# Patient Record
Sex: FEMALE | Race: WHITE | NOT HISPANIC OR LATINO
[De-identification: names, ages, dates, MRNs, and addresses within clinical notes are randomized per-mention and may not be internally consistent; named-entity substitution may affect disease eponyms.]

---

## 2017-01-04 ENCOUNTER — APPOINTMENT (OUTPATIENT)
Dept: INTERNAL MEDICINE | Facility: CLINIC | Age: 37
End: 2017-01-04

## 2017-01-04 VITALS
SYSTOLIC BLOOD PRESSURE: 120 MMHG | DIASTOLIC BLOOD PRESSURE: 80 MMHG | WEIGHT: 196 LBS | BODY MASS INDEX: 33.46 KG/M2 | TEMPERATURE: 97.8 F | HEIGHT: 64 IN

## 2017-01-04 DIAGNOSIS — E55.9 VITAMIN D DEFICIENCY, UNSPECIFIED: ICD-10-CM

## 2017-01-06 ENCOUNTER — MED ADMIN CHARGE (OUTPATIENT)
Age: 37
End: 2017-01-06

## 2017-12-26 ENCOUNTER — LABORATORY RESULT (OUTPATIENT)
Age: 37
End: 2017-12-26

## 2017-12-26 ENCOUNTER — APPOINTMENT (OUTPATIENT)
Dept: INTERNAL MEDICINE | Facility: CLINIC | Age: 37
End: 2017-12-26
Payer: COMMERCIAL

## 2017-12-26 PROCEDURE — 36415 COLL VENOUS BLD VENIPUNCTURE: CPT

## 2017-12-27 LAB
25(OH)D3 SERPL-MCNC: 22.4 NG/ML
ALBUMIN SERPL ELPH-MCNC: 4.2 G/DL
ALP BLD-CCNC: 49 U/L
ALT SERPL-CCNC: 11 U/L
ANION GAP SERPL CALC-SCNC: 13 MMOL/L
APPEARANCE: CLEAR
AST SERPL-CCNC: 20 U/L
BASOPHILS # BLD AUTO: 0.01 K/UL
BASOPHILS NFR BLD AUTO: 0.2 %
BILIRUB SERPL-MCNC: 0.2 MG/DL
BILIRUBIN URINE: NEGATIVE
BLOOD URINE: NEGATIVE
BUN SERPL-MCNC: 13 MG/DL
CALCIUM SERPL-MCNC: 9.3 MG/DL
CHLORIDE SERPL-SCNC: 105 MMOL/L
CHOLEST SERPL-MCNC: 199 MG/DL
CHOLEST/HDLC SERPL: 4.3 RATIO
CO2 SERPL-SCNC: 22 MMOL/L
COLOR: YELLOW
CREAT SERPL-MCNC: 0.87 MG/DL
EOSINOPHIL # BLD AUTO: 0.03 K/UL
EOSINOPHIL NFR BLD AUTO: 0.5
ERYTHROCYTE [SEDIMENTATION RATE] IN BLOOD BY WESTERGREN METHOD: 2 MM/HR
GLUCOSE QUALITATIVE U: NEGATIVE MG/DL
GLUCOSE SERPL-MCNC: 92 MG/DL
HCT VFR BLD CALC: 39.9 %
HDLC SERPL-MCNC: 46 MG/DL
HGB BLD-MCNC: 13.1 G/DL
IMM GRANULOCYTES NFR BLD AUTO: 0.2 %
KETONES URINE: NEGATIVE
LDLC SERPL CALC-MCNC: 120 MG/DL
LDLC SERPL DIRECT ASSAY-MCNC: 123 MG/DL
LEUKOCYTE ESTERASE URINE: ABNORMAL
LYMPHOCYTES # BLD AUTO: 2.11 K/UL
LYMPHOCYTES NFR BLD AUTO: 35.8 %
MAN DIFF?: NORMAL
MCHC RBC-ENTMCNC: 29.1 PG
MCHC RBC-ENTMCNC: 32.8 GM/DL
MCV RBC AUTO: 88.7 FL
MONOCYTES # BLD AUTO: 0.3 K/UL
MONOCYTES NFR BLD AUTO: 5.1 %
NEUTROPHILS # BLD AUTO: 3.44 K/UL
NEUTROPHILS NFR BLD AUTO: 58.2 %
NITRITE URINE: NEGATIVE
PH URINE: 5.5
PLATELET # BLD AUTO: 245 K/UL
POTASSIUM SERPL-SCNC: 4.6 MMOL/L
PROT SERPL-MCNC: 7.3 G/DL
PROTEIN URINE: NEGATIVE MG/DL
RBC # BLD: 4.5 M/UL
RBC # FLD: 13.2 %
SAVE SPECIMEN: NORMAL
SODIUM SERPL-SCNC: 140 MMOL/L
SPECIFIC GRAVITY URINE: 1.02
T3 SERPL-MCNC: 91 NG/DL
T3RU NFR SERPL: 1.14 INDEX
T4 FREE SERPL-MCNC: 1 NG/DL
TRIGL SERPL-MCNC: 163 MG/DL
TSH SERPL-ACNC: 2.87 UIU/ML
UROBILINOGEN URINE: NEGATIVE MG/DL
WBC # FLD AUTO: 5.9 K/UL

## 2018-01-08 ENCOUNTER — APPOINTMENT (OUTPATIENT)
Dept: INTERNAL MEDICINE | Facility: CLINIC | Age: 38
End: 2018-01-08
Payer: COMMERCIAL

## 2018-01-08 VITALS
DIASTOLIC BLOOD PRESSURE: 80 MMHG | SYSTOLIC BLOOD PRESSURE: 110 MMHG | TEMPERATURE: 98.2 F | WEIGHT: 190 LBS | BODY MASS INDEX: 32.44 KG/M2 | HEIGHT: 64 IN

## 2018-01-08 PROCEDURE — 93000 ELECTROCARDIOGRAM COMPLETE: CPT

## 2018-01-08 PROCEDURE — 94010 BREATHING CAPACITY TEST: CPT

## 2018-01-08 PROCEDURE — 99395 PREV VISIT EST AGE 18-39: CPT | Mod: 25

## 2018-01-09 ENCOUNTER — TRANSCRIPTION ENCOUNTER (OUTPATIENT)
Age: 38
End: 2018-01-09

## 2018-12-26 ENCOUNTER — LABORATORY RESULT (OUTPATIENT)
Age: 38
End: 2018-12-26

## 2018-12-26 ENCOUNTER — APPOINTMENT (OUTPATIENT)
Dept: INTERNAL MEDICINE | Facility: CLINIC | Age: 38
End: 2018-12-26
Payer: COMMERCIAL

## 2018-12-26 LAB
ALBUMIN SERPL ELPH-MCNC: 4.3 G/DL
ALP BLD-CCNC: 67 U/L
ALT SERPL-CCNC: 20 U/L
ANION GAP SERPL CALC-SCNC: 8 MMOL/L
APPEARANCE: CLEAR
AST SERPL-CCNC: 24 U/L
BASOPHILS # BLD AUTO: 0.01 K/UL
BASOPHILS NFR BLD AUTO: 0.2 %
BILIRUB SERPL-MCNC: 0.2 MG/DL
BILIRUBIN URINE: NEGATIVE
BLOOD URINE: NEGATIVE
BUN SERPL-MCNC: 10 MG/DL
CALCIUM SERPL-MCNC: 9.7 MG/DL
CHLORIDE SERPL-SCNC: 103 MMOL/L
CHOLEST SERPL-MCNC: 225 MG/DL
CHOLEST/HDLC SERPL: 5.6 RATIO
CO2 SERPL-SCNC: 24 MMOL/L
COLOR: YELLOW
CREAT SERPL-MCNC: 0.85 MG/DL
EOSINOPHIL # BLD AUTO: 0.07 K/UL
EOSINOPHIL NFR BLD AUTO: 1.2 %
ERYTHROCYTE [SEDIMENTATION RATE] IN BLOOD BY WESTERGREN METHOD: 5 MM/HR
GLUCOSE QUALITATIVE U: NEGATIVE MG/DL
GLUCOSE SERPL-MCNC: 87 MG/DL
HCT VFR BLD CALC: 38.8 %
HDLC SERPL-MCNC: 40 MG/DL
HGB BLD-MCNC: 12.8 G/DL
IMM GRANULOCYTES NFR BLD AUTO: 0.2 %
KETONES URINE: NEGATIVE
LDLC SERPL CALC-MCNC: 135 MG/DL
LDLC SERPL DIRECT ASSAY-MCNC: 148 MG/DL
LEUKOCYTE ESTERASE URINE: NEGATIVE
LYMPHOCYTES # BLD AUTO: 2.6 K/UL
LYMPHOCYTES NFR BLD AUTO: 44.7 %
MAN DIFF?: NORMAL
MCHC RBC-ENTMCNC: 28.4 PG
MCHC RBC-ENTMCNC: 33 GM/DL
MCV RBC AUTO: 86 FL
MONOCYTES # BLD AUTO: 0.33 K/UL
MONOCYTES NFR BLD AUTO: 5.7 %
NEUTROPHILS # BLD AUTO: 2.8 K/UL
NEUTROPHILS NFR BLD AUTO: 48 %
NITRITE URINE: NEGATIVE
PH URINE: 6
PLATELET # BLD AUTO: 255 K/UL
POTASSIUM SERPL-SCNC: 3.9 MMOL/L
PROT SERPL-MCNC: 7.2 G/DL
PROTEIN URINE: NEGATIVE MG/DL
RBC # BLD: 4.51 M/UL
RBC # FLD: 13 %
SAVE SPECIMEN: NORMAL
SODIUM SERPL-SCNC: 135 MMOL/L
SPECIFIC GRAVITY URINE: 1.02
T3 SERPL-MCNC: 82 NG/DL
T3RU NFR SERPL: 1.02 INDEX
T4 FREE SERPL-MCNC: 1 NG/DL
TRIGL SERPL-MCNC: 249 MG/DL
TSH SERPL-ACNC: 2.47 UIU/ML
UROBILINOGEN URINE: NEGATIVE MG/DL
WBC # FLD AUTO: 5.82 K/UL

## 2018-12-26 PROCEDURE — 36415 COLL VENOUS BLD VENIPUNCTURE: CPT

## 2018-12-27 LAB — 25(OH)D3 SERPL-MCNC: 13.8 NG/ML

## 2019-01-14 ENCOUNTER — APPOINTMENT (OUTPATIENT)
Dept: INTERNAL MEDICINE | Facility: CLINIC | Age: 39
End: 2019-01-14
Payer: COMMERCIAL

## 2019-01-14 VITALS
SYSTOLIC BLOOD PRESSURE: 110 MMHG | BODY MASS INDEX: 34.15 KG/M2 | HEIGHT: 64 IN | TEMPERATURE: 96.9 F | WEIGHT: 200 LBS | DIASTOLIC BLOOD PRESSURE: 68 MMHG

## 2019-01-14 DIAGNOSIS — E78.00 PURE HYPERCHOLESTEROLEMIA, UNSPECIFIED: ICD-10-CM

## 2019-01-14 PROCEDURE — 94010 BREATHING CAPACITY TEST: CPT

## 2019-01-14 PROCEDURE — 99396 PREV VISIT EST AGE 40-64: CPT | Mod: 25

## 2019-01-14 PROCEDURE — 93000 ELECTROCARDIOGRAM COMPLETE: CPT

## 2019-01-14 RX ORDER — NORETHINDRONE ACETATE AND ETHINYL ESTRADIOL 1.5-30(21)
KIT ORAL
Refills: 0 | Status: COMPLETED | COMMUNITY
End: 2019-01-14

## 2019-01-21 NOTE — HEALTH RISK ASSESSMENT
[Patient reported PAP Smear was normal] : Patient reported PAP Smear was normal [Good] : ~his/her~  mood as  good [Patient reported mammogram was normal] : Patient reported mammogram was normal [] : No [de-identified] : rare/sociall [MammogramDate] : 12/18 [MammogramComments] : at [facility] [PapSmearDate] : 12/18 [PapSmearComments] : at [facility]-cycles are regular on OCPs

## 2019-01-21 NOTE — REVIEW OF SYSTEMS
[Negative] : Heme/Lymph [Recent Change In Weight] : ~T recent weight change [FreeTextEntry2] : weight is up since last year

## 2019-01-21 NOTE — DATA REVIEWED
[FreeTextEntry1] : EKG today was within normal limits\par PFT revealed mild restriction-not significantly changed from PFT from 01/08/2018\par

## 2019-01-21 NOTE — HISTORY OF PRESENT ILLNESS
[FreeTextEntry1] : Pt. presents for a comprehensive evaluation for multiple medical issues.\par  [de-identified] : Patient has been in good overall health this past year.\par \par She reports her weight is slight up since last year but overall she is feeling good.\par She lives with her boyfriend of 4 years and they are considering having a baby together this year.

## 2019-03-26 ENCOUNTER — APPOINTMENT (OUTPATIENT)
Dept: OBGYN | Facility: CLINIC | Age: 39
End: 2019-03-26

## 2019-06-12 ENCOUNTER — APPOINTMENT (OUTPATIENT)
Dept: OBGYN | Facility: CLINIC | Age: 39
End: 2019-06-12
Payer: COMMERCIAL

## 2019-06-12 ENCOUNTER — NON-APPOINTMENT (OUTPATIENT)
Age: 39
End: 2019-06-12

## 2019-06-12 ENCOUNTER — LABORATORY RESULT (OUTPATIENT)
Age: 39
End: 2019-06-12

## 2019-06-12 VITALS
SYSTOLIC BLOOD PRESSURE: 122 MMHG | DIASTOLIC BLOOD PRESSURE: 80 MMHG | WEIGHT: 200 LBS | HEIGHT: 64 IN | BODY MASS INDEX: 34.15 KG/M2

## 2019-06-12 PROCEDURE — 36415 COLL VENOUS BLD VENIPUNCTURE: CPT

## 2019-06-13 ENCOUNTER — NON-APPOINTMENT (OUTPATIENT)
Age: 39
End: 2019-06-13

## 2019-06-14 LAB
APPEARANCE: ABNORMAL
BACTERIA UR CULT: NORMAL
BILIRUBIN URINE: NEGATIVE
BLOOD URINE: NEGATIVE
COLOR: NORMAL
GLUCOSE QUALITATIVE U: NEGATIVE
KETONES URINE: NEGATIVE
LEUKOCYTE ESTERASE URINE: ABNORMAL
MEV IGG FLD QL IA: 59.3 AU/ML
MEV IGG+IGM SER-IMP: POSITIVE
NITRITE URINE: NEGATIVE
PH URINE: 6
PROTEIN URINE: NORMAL
SPECIFIC GRAVITY URINE: 1.01
UROBILINOGEN URINE: NORMAL

## 2019-06-19 ENCOUNTER — APPOINTMENT (OUTPATIENT)
Dept: OBGYN | Facility: CLINIC | Age: 39
End: 2019-06-19

## 2019-06-24 ENCOUNTER — NON-APPOINTMENT (OUTPATIENT)
Age: 39
End: 2019-06-24

## 2019-06-24 ENCOUNTER — APPOINTMENT (OUTPATIENT)
Dept: OBGYN | Facility: CLINIC | Age: 39
End: 2019-06-24
Payer: COMMERCIAL

## 2019-06-24 VITALS — BODY MASS INDEX: 33.82 KG/M2 | WEIGHT: 197 LBS | DIASTOLIC BLOOD PRESSURE: 70 MMHG | SYSTOLIC BLOOD PRESSURE: 120 MMHG

## 2019-06-24 LAB
BILIRUB UR QL STRIP: NORMAL
CLARITY UR: CLEAR
COLLECTION METHOD: NORMAL
GLUCOSE UR-MCNC: 250
HCG UR QL: 0.2 EU/DL
HGB UR QL STRIP.AUTO: NORMAL
KETONES UR-MCNC: NORMAL
LEUKOCYTE ESTERASE UR QL STRIP: NORMAL
NITRITE UR QL STRIP: NORMAL
PH UR STRIP: 5.5
PROT UR STRIP-MCNC: NORMAL
SP GR UR STRIP: 1.03

## 2019-06-24 PROCEDURE — 36415 COLL VENOUS BLD VENIPUNCTURE: CPT

## 2019-06-24 PROCEDURE — 0502F SUBSEQUENT PRENATAL CARE: CPT

## 2019-06-25 ENCOUNTER — NON-APPOINTMENT (OUTPATIENT)
Age: 39
End: 2019-06-25

## 2019-06-25 LAB
BASOPHILS # BLD AUTO: 0.02 K/UL
BASOPHILS NFR BLD AUTO: 0.2 %
EOSINOPHIL # BLD AUTO: 0.05 K/UL
EOSINOPHIL NFR BLD AUTO: 0.6 %
GLUCOSE 1H P 50 G GLC PO SERPL-MCNC: 121 MG/DL
HCT VFR BLD CALC: 34.4 %
HGB BLD-MCNC: 10.9 G/DL
IMM GRANULOCYTES NFR BLD AUTO: 0.4 %
LYMPHOCYTES # BLD AUTO: 2.12 K/UL
LYMPHOCYTES NFR BLD AUTO: 23.6 %
MAN DIFF?: NORMAL
MCHC RBC-ENTMCNC: 29.5 PG
MCHC RBC-ENTMCNC: 31.7 GM/DL
MCV RBC AUTO: 93 FL
MONOCYTES # BLD AUTO: 0.59 K/UL
MONOCYTES NFR BLD AUTO: 6.6 %
NEUTROPHILS # BLD AUTO: 6.15 K/UL
NEUTROPHILS NFR BLD AUTO: 68.6 %
PLATELET # BLD AUTO: 202 K/UL
RBC # BLD: 3.7 M/UL
RBC # FLD: 13.8 %
WBC # FLD AUTO: 8.97 K/UL

## 2019-07-12 ENCOUNTER — NON-APPOINTMENT (OUTPATIENT)
Age: 39
End: 2019-07-12

## 2019-07-15 ENCOUNTER — NON-APPOINTMENT (OUTPATIENT)
Age: 39
End: 2019-07-15

## 2019-07-15 ENCOUNTER — APPOINTMENT (OUTPATIENT)
Dept: OBGYN | Facility: CLINIC | Age: 39
End: 2019-07-15
Payer: COMMERCIAL

## 2019-07-15 VITALS
BODY MASS INDEX: 34.66 KG/M2 | WEIGHT: 203 LBS | HEIGHT: 64 IN | DIASTOLIC BLOOD PRESSURE: 76 MMHG | SYSTOLIC BLOOD PRESSURE: 120 MMHG

## 2019-07-15 LAB
BILIRUB UR QL STRIP: NEGATIVE
CLARITY UR: NORMAL
COLLECTION METHOD: NORMAL
GLUCOSE UR-MCNC: >=1000
HCG UR QL: 0.2 EU/DL
HGB UR QL STRIP.AUTO: NEGATIVE
KETONES UR-MCNC: NORMAL
LEUKOCYTE ESTERASE UR QL STRIP: NORMAL
NITRITE UR QL STRIP: NEGATIVE
PH UR STRIP: 5.5
PROT UR STRIP-MCNC: NEGATIVE
SP GR UR STRIP: 1.01

## 2019-07-15 PROCEDURE — 0502F SUBSEQUENT PRENATAL CARE: CPT

## 2019-07-30 ENCOUNTER — APPOINTMENT (OUTPATIENT)
Dept: OBGYN | Facility: CLINIC | Age: 39
End: 2019-07-30
Payer: COMMERCIAL

## 2019-07-30 VITALS
DIASTOLIC BLOOD PRESSURE: 72 MMHG | BODY MASS INDEX: 34.15 KG/M2 | WEIGHT: 200 LBS | HEIGHT: 64 IN | SYSTOLIC BLOOD PRESSURE: 120 MMHG

## 2019-07-30 DIAGNOSIS — Z3A.32 32 WEEKS GESTATION OF PREGNANCY: ICD-10-CM

## 2019-07-30 PROCEDURE — 90715 TDAP VACCINE 7 YRS/> IM: CPT

## 2019-07-30 PROCEDURE — 90471 IMMUNIZATION ADMIN: CPT

## 2019-07-30 PROCEDURE — 0502F SUBSEQUENT PRENATAL CARE: CPT

## 2019-07-31 ENCOUNTER — MED ADMIN CHARGE (OUTPATIENT)
Age: 39
End: 2019-07-31

## 2019-08-05 LAB
BILIRUB UR QL STRIP: NEGATIVE
GLUCOSE UR-MCNC: NEGATIVE
HCG UR QL: 0.2 EU/DL
HGB UR QL STRIP.AUTO: NORMAL
KETONES UR-MCNC: NORMAL
LEUKOCYTE ESTERASE UR QL STRIP: NORMAL
NITRITE UR QL STRIP: NEGATIVE
PH UR STRIP: 6
PROT UR STRIP-MCNC: NORMAL
SP GR UR STRIP: 1.03

## 2019-08-14 ENCOUNTER — APPOINTMENT (OUTPATIENT)
Dept: OBGYN | Facility: CLINIC | Age: 39
End: 2019-08-14
Payer: COMMERCIAL

## 2019-08-14 ENCOUNTER — NON-APPOINTMENT (OUTPATIENT)
Age: 39
End: 2019-08-14

## 2019-08-14 VITALS
BODY MASS INDEX: 33.8 KG/M2 | WEIGHT: 198 LBS | HEIGHT: 64 IN | DIASTOLIC BLOOD PRESSURE: 80 MMHG | SYSTOLIC BLOOD PRESSURE: 120 MMHG

## 2019-08-14 LAB
BILIRUB UR QL STRIP: NEGATIVE
GLUCOSE UR-MCNC: NEGATIVE
HGB UR QL STRIP.AUTO: NORMAL
KETONES UR-MCNC: NEGATIVE
LEUKOCYTE ESTERASE UR QL STRIP: NORMAL
NITRITE UR QL STRIP: NEGATIVE
PH UR STRIP: 6
PROT UR STRIP-MCNC: 30
SP GR UR STRIP: 1.02

## 2019-08-14 PROCEDURE — 81015 MICROSCOPIC EXAM OF URINE: CPT

## 2019-08-14 PROCEDURE — 0502F SUBSEQUENT PRENATAL CARE: CPT

## 2019-08-16 ENCOUNTER — APPOINTMENT (OUTPATIENT)
Dept: OBGYN | Facility: CLINIC | Age: 39
End: 2019-08-16
Payer: COMMERCIAL

## 2019-08-16 ENCOUNTER — NON-APPOINTMENT (OUTPATIENT)
Age: 39
End: 2019-08-16

## 2019-08-16 VITALS
SYSTOLIC BLOOD PRESSURE: 118 MMHG | BODY MASS INDEX: 34.15 KG/M2 | DIASTOLIC BLOOD PRESSURE: 78 MMHG | WEIGHT: 200 LBS | HEIGHT: 64 IN

## 2019-08-16 PROCEDURE — 59025 FETAL NON-STRESS TEST: CPT

## 2019-08-28 ENCOUNTER — APPOINTMENT (OUTPATIENT)
Dept: OBGYN | Facility: CLINIC | Age: 39
End: 2019-08-28
Payer: COMMERCIAL

## 2019-08-28 ENCOUNTER — NON-APPOINTMENT (OUTPATIENT)
Age: 39
End: 2019-08-28

## 2019-08-28 VITALS
BODY MASS INDEX: 34.15 KG/M2 | HEIGHT: 64 IN | SYSTOLIC BLOOD PRESSURE: 120 MMHG | DIASTOLIC BLOOD PRESSURE: 70 MMHG | WEIGHT: 200 LBS

## 2019-08-28 PROCEDURE — 0502F SUBSEQUENT PRENATAL CARE: CPT

## 2019-09-03 ENCOUNTER — APPOINTMENT (OUTPATIENT)
Dept: OBGYN | Facility: CLINIC | Age: 39
End: 2019-09-03
Payer: COMMERCIAL

## 2019-09-03 ENCOUNTER — NON-APPOINTMENT (OUTPATIENT)
Age: 39
End: 2019-09-03

## 2019-09-03 VITALS
BODY MASS INDEX: 34.15 KG/M2 | SYSTOLIC BLOOD PRESSURE: 118 MMHG | HEIGHT: 64 IN | WEIGHT: 200 LBS | DIASTOLIC BLOOD PRESSURE: 70 MMHG

## 2019-09-03 LAB
BILIRUB UR QL STRIP: NORMAL
CLARITY UR: NORMAL
COLLECTION METHOD: NORMAL
GLUCOSE UR-MCNC: NORMAL
HCG UR QL: 1 EU/DL
HGB UR QL STRIP.AUTO: NORMAL
KETONES UR-MCNC: 15
LEUKOCYTE ESTERASE UR QL STRIP: NORMAL
NITRITE UR QL STRIP: NORMAL
PH UR STRIP: 6.5
PROT UR STRIP-MCNC: NORMAL
SP GR UR STRIP: 1.02

## 2019-09-03 PROCEDURE — 59025 FETAL NON-STRESS TEST: CPT

## 2019-09-03 PROCEDURE — 0502F SUBSEQUENT PRENATAL CARE: CPT

## 2019-09-05 ENCOUNTER — NON-APPOINTMENT (OUTPATIENT)
Age: 39
End: 2019-09-05

## 2019-09-11 ENCOUNTER — APPOINTMENT (OUTPATIENT)
Dept: OBGYN | Facility: CLINIC | Age: 39
End: 2019-09-11
Payer: COMMERCIAL

## 2019-09-11 ENCOUNTER — NON-APPOINTMENT (OUTPATIENT)
Age: 39
End: 2019-09-11

## 2019-09-11 VITALS
HEIGHT: 64 IN | DIASTOLIC BLOOD PRESSURE: 70 MMHG | SYSTOLIC BLOOD PRESSURE: 116 MMHG | BODY MASS INDEX: 34.15 KG/M2 | WEIGHT: 200 LBS

## 2019-09-11 PROCEDURE — 59426 ANTEPARTUM CARE ONLY: CPT

## 2019-09-11 PROCEDURE — 0502F SUBSEQUENT PRENATAL CARE: CPT

## 2019-09-14 LAB
BILIRUB UR QL STRIP: NEGATIVE
CLARITY UR: CLEAR
COLLECTION METHOD: NORMAL
GLUCOSE UR-MCNC: NEGATIVE
HCG UR QL: 0.2 EU/DL
HGB UR QL STRIP.AUTO: NEGATIVE
KETONES UR-MCNC: NEGATIVE
LEUKOCYTE ESTERASE UR QL STRIP: NEGATIVE
NITRITE UR QL STRIP: NEGATIVE
PH UR STRIP: 6
PROT UR STRIP-MCNC: NEGATIVE
SP GR UR STRIP: 1.01

## 2019-09-18 ENCOUNTER — APPOINTMENT (OUTPATIENT)
Dept: OBGYN | Facility: CLINIC | Age: 39
End: 2019-09-18

## 2019-09-24 NOTE — DISCUSSION/SUMMARY
[Home] : patient was discharged to home [FreeTextEntry1] : Discharge Diagnosis: Term pregnancy delivered, Viable male

## 2019-09-30 LAB
BILIRUB UR QL STRIP: NEGATIVE
CLARITY UR: CLEAR
COLLECTION METHOD: NORMAL
GLUCOSE UR-MCNC: NEGATIVE
HCG UR QL: 1 EU/DL
HGB UR QL STRIP.AUTO: NORMAL
KETONES UR-MCNC: 80
LEUKOCYTE ESTERASE UR QL STRIP: NORMAL
NITRITE UR QL STRIP: NEGATIVE
PH UR STRIP: 6
PROT UR STRIP-MCNC: 30
SP GR UR STRIP: 1.02

## 2019-10-02 ENCOUNTER — APPOINTMENT (OUTPATIENT)
Dept: OBGYN | Facility: CLINIC | Age: 39
End: 2019-10-02
Payer: COMMERCIAL

## 2019-10-02 VITALS
WEIGHT: 184 LBS | HEIGHT: 64 IN | SYSTOLIC BLOOD PRESSURE: 116 MMHG | BODY MASS INDEX: 31.41 KG/M2 | DIASTOLIC BLOOD PRESSURE: 70 MMHG

## 2019-10-02 DIAGNOSIS — Z48.89 ENCOUNTER FOR OTHER SPECIFIED SURGICAL AFTERCARE: ICD-10-CM

## 2019-10-02 PROCEDURE — 0503F POSTPARTUM CARE VISIT: CPT

## 2019-10-02 NOTE — REVIEW OF SYSTEMS
[Chills] : no chills [Feeling Tired] : feeling tired [Chest Pain] : no chest pain [Dyspnea] : no shortness of breath [Abdominal Pain] : abdominal pain [Melena] : no melena [Constipation] : no constipation [Dysuria] : no dysuria [Pelvic Pain] : no pelvic pain [Frequency] : no frequency [Urgency] : no urgency [Arthralgias] : no arthralgias [Skin Lesions] : no skin lesions [Change In A Mole] : no change in a mole [Breast Pain] : breast pain [Breast Lump] : breast lump [Convulsions] : no convulsions [Fainting] : no fainting [Dizziness] : no dizziness [Headache] : no headache [Anxiety] : no anxiety [Depression] : no depression [Easy Bleeding] : does not bleed easily [Easy Bruising] : does not bruise easily [Nl] : Musculoskeletal

## 2019-10-02 NOTE — PHYSICAL EXAM
[Awake] : awake [Alert] : alert [Acute Distress] : no acute distress [Soft] : soft [Tender] : non tender [Distended] : not distended [H/Smegaly] : no hepatosplenomegaly [Oriented x3] : oriented to person, place, and time [Depressed Mood] : not depressed [Flat Affect] : affect not flat

## 2019-10-02 NOTE — DISCUSSION/SUMMARY
[FreeTextEntry1] : Postoperative visit from  section\par Trent - boy\par no s/sx of depression\par incision - developing an infection - pruritus with PCN\par Rx for keflex\par \par Will return in 4 wks - sooner if any issues\par \par Cristina Coley MD, PhD\par

## 2019-10-30 ENCOUNTER — APPOINTMENT (OUTPATIENT)
Dept: OBGYN | Facility: CLINIC | Age: 39
End: 2019-10-30
Payer: COMMERCIAL

## 2019-10-30 VITALS
BODY MASS INDEX: 30.05 KG/M2 | DIASTOLIC BLOOD PRESSURE: 78 MMHG | WEIGHT: 176 LBS | HEIGHT: 64 IN | SYSTOLIC BLOOD PRESSURE: 118 MMHG

## 2019-10-30 PROCEDURE — 0503F POSTPARTUM CARE VISIT: CPT

## 2019-10-30 RX ORDER — DOCUSATE SODIUM 100 MG/1
100 CAPSULE ORAL TWICE DAILY
Qty: 60 | Refills: 0 | Status: DISCONTINUED | COMMUNITY
Start: 2019-08-14 | End: 2019-10-30

## 2019-10-30 RX ORDER — CEPHALEXIN 500 MG/1
500 CAPSULE ORAL 4 TIMES DAILY
Qty: 28 | Refills: 0 | Status: DISCONTINUED | COMMUNITY
Start: 2019-10-02 | End: 2019-10-30

## 2019-10-30 NOTE — HISTORY OF PRESENT ILLNESS
[Postpartum Follow Up] : postpartum follow up [Complications:___] : no complications [Last Pap Date: ___] : Last Pap Date: [unfilled] [Delivery Date: ___] : on [unfilled] [Primary C/S] : delivered by  section [Male] : Delivery History: baby boy [BTL] : no tubal ligation [Breastfeeding] : currently nursing [Resumed Menses] : has not resumed her menses [Resumed Newell] : has not resumed intercourse [Intended Contraception] : Intended Contraception: [Oral Contraceptives] : oral contraceptives [Breast Pain] : no breast pain [Chest Pain] : no chest pain [Cracked Nipples] : no cracked nipples [S/Sx PP Depression] : no signs/symptoms of postpartum depression [Incisional Drainage] : no incisional drainage [Incisional Pain] : no incisional pain [Irregular Bleeding] : no irregular bleeding [Shortness of Breath] : no shortness of breath [Suicidal Ideation] : no suicidal ideation [Vaginal Discharge] : no vaginal discharge [Chills] : no chills [Fatigue] : fatigue [Dysuria] : no dysuria [Fever] : no fever [Headache] : no headache [Nausea] : no nausea [Vomiting] : no vomiting [Clean/Dry/Intact] : clean, dry and intact [Erythema] : not erythematous [Swelling] : not swollen [Dehiscence] : not dehisced [Healed] : healed [Back to Normal] : is back to normal in size [Cervix Sample Taken] : cervical sample not taken for a Pap smear [Not Done] : Examination of breasts not done [Awake] : awake [Alert] : alert [Acute Distress] : no acute distress [Soft] : soft [Tender] : non tender [Distended] : not distended [H/Smegaly] : no hepatosplenomegaly [Oriented x3] : oriented to person, place, and time [Depressed Mood] : not depressed [Flat Affect] : affect not flat [No Lesions] : no genitalia lesions [Labia Majora Erythema] : no erythema of the labia majora [Labia Minora Erythema] : no erythema of the labia minora [Normal] : uterus [Labia Majora] : labia major [Labia Minora] : labia minora [Pink Rugae] : pink rugae [Normal Position] : in a normal position [Tenderness] : nontender [Enlarged ___ wks] : not enlarged [Mass ___ cm] : no uterine mass was palpated [Uterine Adnexae] : were not tender and not enlarged [Adnexa Tenderness] : were not tender [Ovarian Mass (___ Cm)] : there were no adnexal masses [RRR, No Murmurs] : RRR, no murmurs [CTAB] : CTAB [Doing Well] : is doing well [No Sign of Infection] : is showing no signs of infection [Excellent Pain Control] : has excellent pain control [None] : None [FreeTextEntry8] : Presents for routine pp care. Overall feeling well and resuming normal activity. Incision pain and surgical pain resolving [de-identified] : Non-reassuring FHT and remote from delivery; Trent lujan [de-identified] : Pumping and supplementation with formula [de-identified] : progesterone only pill for 1-2 mo then combined oral contraceptive [de-identified] : EDS 1 today [de-identified] : 6 wks s/p  section [de-identified] : routine care; f/up in early Jan for annual exam - going back to work end of January

## 2019-11-04 LAB — B-HEM STREP SPEC QL CULT: NORMAL

## 2019-12-19 ENCOUNTER — APPOINTMENT (OUTPATIENT)
Dept: INTERNAL MEDICINE | Facility: CLINIC | Age: 39
End: 2019-12-19
Payer: COMMERCIAL

## 2019-12-19 VITALS
HEIGHT: 64 IN | WEIGHT: 173 LBS | BODY MASS INDEX: 29.53 KG/M2 | DIASTOLIC BLOOD PRESSURE: 70 MMHG | OXYGEN SATURATION: 99 % | TEMPERATURE: 97.6 F | HEART RATE: 56 BPM | SYSTOLIC BLOOD PRESSURE: 100 MMHG

## 2019-12-19 DIAGNOSIS — G56.02 CARPAL TUNNEL SYNDROME, LEFT UPPER LIMB: ICD-10-CM

## 2019-12-19 DIAGNOSIS — S20.129A OTHER DISORDERS OF BREAST ASSOCIATED WITH PREGNANCY AND THE PUERPERIUM: ICD-10-CM

## 2019-12-19 DIAGNOSIS — O92.29 OTHER DISORDERS OF BREAST ASSOCIATED WITH PREGNANCY AND THE PUERPERIUM: ICD-10-CM

## 2019-12-19 PROCEDURE — 99214 OFFICE O/P EST MOD 30 MIN: CPT

## 2019-12-20 PROBLEM — O92.29 POSTPARTUM MILK BLEB: Status: ACTIVE | Noted: 2019-12-20

## 2020-01-13 ENCOUNTER — APPOINTMENT (OUTPATIENT)
Dept: OBGYN | Facility: CLINIC | Age: 40
End: 2020-01-13
Payer: COMMERCIAL

## 2020-01-13 VITALS
BODY MASS INDEX: 30.05 KG/M2 | SYSTOLIC BLOOD PRESSURE: 110 MMHG | DIASTOLIC BLOOD PRESSURE: 60 MMHG | HEIGHT: 64 IN | WEIGHT: 176 LBS

## 2020-01-13 PROCEDURE — 99396 PREV VISIT EST AGE 40-64: CPT

## 2020-01-13 RX ORDER — NORETHINDRONE ACETATE AND ETHINYL ESTRADIOL AND FERROUS FUMARATE 1MG-20(21)
1-20 KIT ORAL
Qty: 3 | Refills: 3 | Status: DISCONTINUED | COMMUNITY
Start: 2019-10-30 | End: 2020-01-13

## 2020-01-13 RX ORDER — NORETHINDRONE 0.35 MG/1
0.35 TABLET ORAL DAILY
Qty: 2 | Refills: 3 | Status: DISCONTINUED | COMMUNITY
Start: 2019-10-30 | End: 2020-01-13

## 2020-01-13 RX ORDER — IBUPROFEN 600 MG/1
600 TABLET, FILM COATED ORAL
Qty: 30 | Refills: 0 | Status: DISCONTINUED | COMMUNITY
Start: 2019-09-23 | End: 2020-01-13

## 2020-01-13 NOTE — REVIEW OF SYSTEMS
[Chills] : no chills [Palpitations] : no palpitations [Feeling Tired] : not feeling tired [Chest Pain] : no chest pain [Cough] : no cough [Dyspnea] : no shortness of breath [SOB on Exertion] : no shortness of breath during exertion [Abdominal Pain] : no abdominal pain [Vomiting] : no vomiting [Constipation] : no constipation [Diarrhea] : no diarrhea [Dysuria] : no dysuria [Pelvic Pain] : no pelvic pain [Abn Vag Bleeding] : no abnormal vaginal bleeding [Joint Pain] : joint pain [Arthralgias] : arthralgias [Breast Pain] : no breast pain [Skin Lesions] : no skin lesions [Breast Lump] : no breast lump [Dizziness] : no dizziness [Headache] : no headache [Anxiety] : no anxiety [Sleep Disturbances] : no sleep disturbances [Depression] : no depression [Hot Flashes] : no hot flashes [Deepening Voice] : no deepening of the voice [Feeling Weak] : no feelings of weakness [Nl] : Hematologic/Lymphatic

## 2020-01-13 NOTE — DISCUSSION/SUMMARY
[FreeTextEntry1] : Annual gynecological care\par Pap with HR HPV\par Breast cancer screening with dense breasts - mammo with u/s\par \par BCM  -- reviewed combined ocps vs IUD\par will start loestrin - \par Considering another pregnancy - will wait 1 year\par early appt for early genetic screening to be scheduled possible [facility] - last pregnancy panorama positive - with neg amnio\par \par incision healing well - as activity changes will be some musculoskeletal changes and discomfort - will resolve - reviewed scar massage and creams for scars\par \par answered all questions\par \par Cristina Coley MD, PhD\par

## 2020-01-13 NOTE — PHYSICAL EXAM
[Awake] : awake [Alert] : alert [Acute Distress] : no acute distress [LAD] : no lymphadenopathy [Goiter] : no goiter [Thyroid Nodule] : no thyroid nodule [Mass] : no breast mass [Nipple Discharge] : no nipple discharge [Axillary LAD] : no axillary lymphadenopathy [Soft] : soft [Tender] : non tender [H/Smegaly] : no hepatosplenomegaly [Distended] : not distended [Oriented x3] : oriented to person, place, and time [Flat Affect] : affect not flat [Depressed Mood] : not depressed [Labia Majora Erythema] : no erythema of the labia majora [No Lesions] : no genitalia lesions [Labia Minora Erythema] : no erythema of the labia minora [Labia Majora] : labia major [Normal] : clitoris [Erythema] : no erythema [Labia Minora] : labia minora [No Bleeding] : there was no active vaginal bleeding [Discharge] : no discharge [Polyp ___ cm] : had no polyp [Pap Obtained] : a Pap smear was performed [Motion Tenderness] : there was no cervical motion tenderness [Normal Position] : in a normal position [Tenderness] : nontender [Mass ___ cm] : no uterine mass was palpated [Enlarged ___ wks] : not enlarged [Adnexa Tenderness] : were not tender [Uterine Adnexae] : were not tender and not enlarged [RRR, No Murmurs] : RRR, no murmurs [Ovarian Mass (___ Cm)] : there were no adnexal masses [CTAB] : CTAB

## 2020-01-14 LAB — HPV HIGH+LOW RISK DNA PNL CVX: NOT DETECTED

## 2020-01-16 ENCOUNTER — LABORATORY RESULT (OUTPATIENT)
Age: 40
End: 2020-01-16

## 2020-01-16 ENCOUNTER — APPOINTMENT (OUTPATIENT)
Dept: INTERNAL MEDICINE | Facility: CLINIC | Age: 40
End: 2020-01-16
Payer: COMMERCIAL

## 2020-01-16 PROCEDURE — 36415 COLL VENOUS BLD VENIPUNCTURE: CPT

## 2020-01-17 LAB — CYTOLOGY CVX/VAG DOC THIN PREP: NORMAL

## 2020-01-22 ENCOUNTER — LABORATORY RESULT (OUTPATIENT)
Age: 40
End: 2020-01-22

## 2020-01-22 ENCOUNTER — APPOINTMENT (OUTPATIENT)
Dept: INTERNAL MEDICINE | Facility: CLINIC | Age: 40
End: 2020-01-22
Payer: COMMERCIAL

## 2020-01-22 VITALS
BODY MASS INDEX: 29.19 KG/M2 | WEIGHT: 171 LBS | HEIGHT: 64 IN | DIASTOLIC BLOOD PRESSURE: 70 MMHG | TEMPERATURE: 97.8 F | SYSTOLIC BLOOD PRESSURE: 100 MMHG

## 2020-01-22 DIAGNOSIS — D72.829 ELEVATED WHITE BLOOD CELL COUNT, UNSPECIFIED: ICD-10-CM

## 2020-01-22 DIAGNOSIS — R73.01 IMPAIRED FASTING GLUCOSE: ICD-10-CM

## 2020-01-22 PROCEDURE — 93000 ELECTROCARDIOGRAM COMPLETE: CPT

## 2020-01-22 PROCEDURE — 36415 COLL VENOUS BLD VENIPUNCTURE: CPT

## 2020-01-22 PROCEDURE — 94010 BREATHING CAPACITY TEST: CPT

## 2020-01-22 PROCEDURE — 99396 PREV VISIT EST AGE 40-64: CPT | Mod: 25

## 2020-01-22 NOTE — PHYSICAL EXAM
[Well Nourished] : well nourished [Well Developed] : well developed [No Acute Distress] : no acute distress [Well-Appearing] : well-appearing [PERRL] : pupils equal round and reactive to light [EOMI] : extraocular movements intact [Normal Sclera/Conjunctiva] : normal sclera/conjunctiva [Normal Outer Ear/Nose] : the outer ears and nose were normal in appearance [No JVD] : no jugular venous distention [Normal Oropharynx] : the oropharynx was normal [Supple] : supple [No Lymphadenopathy] : no lymphadenopathy [Thyroid Normal, No Nodules] : the thyroid was normal and there were no nodules present [No Accessory Muscle Use] : no accessory muscle use [No Respiratory Distress] : no respiratory distress  [Clear to Auscultation] : lungs were clear to auscultation bilaterally [Normal S1, S2] : normal S1 and S2 [Regular Rhythm] : with a regular rhythm [Normal Rate] : normal rate  [No Carotid Bruits] : no carotid bruits [No Murmur] : no murmur heard [No Abdominal Bruit] : a ~M bruit was not heard ~T in the abdomen [No Edema] : there was no peripheral edema [No Palpable Aorta] : no palpable aorta [No Varicosities] : no varicosities [Pedal Pulses Present] : the pedal pulses are present [No Extremity Clubbing/Cyanosis] : no extremity clubbing/cyanosis [Normal Appearance] : normal in appearance [Non-distended] : non-distended [No Axillary Lymphadenopathy] : no axillary lymphadenopathy [Soft] : abdomen soft [Non Tender] : non-tender [No Masses] : no abdominal mass palpated [Normal Bowel Sounds] : normal bowel sounds [No HSM] : no HSM [Normal Anterior Cervical Nodes] : no anterior cervical lymphadenopathy [Normal Posterior Cervical Nodes] : no posterior cervical lymphadenopathy [No CVA Tenderness] : no CVA  tenderness [Grossly Normal Strength/Tone] : grossly normal strength/tone [No Spinal Tenderness] : no spinal tenderness [No Joint Swelling] : no joint swelling [No Focal Deficits] : no focal deficits [No Rash] : no rash [Coordination Grossly Intact] : coordination grossly intact [Normal Affect] : the affect was normal [Normal Gait] : normal gait [Deep Tendon Reflexes (DTR)] : deep tendon reflexes were 2+ and symmetric [Normal Insight/Judgement] : insight and judgment were intact

## 2020-01-22 NOTE — HISTORY OF PRESENT ILLNESS
[FreeTextEntry1] : Pt. presents for a comprehensive evaluation for multiple medical issues.\par  [de-identified] : Patient has been in good overall health this past year.\par She got pregnant last year shortly after her last visit and delivered a healthy baby boy via  in 2019 at [place], [place].  She has been on maternity leave and is scheduled to return to work this month.  She stopped nursing this month and plans to go back on Loestrin with her next menstrual cycle.\par \par She reports her weight is down since last year -she gained 8-9 lbs with her entire pregnancy then lost weight post-partum.  Her weight is down ~20-25 lbs since last year.\par She lives with her boyfriend of 5 years.

## 2020-01-22 NOTE — HEALTH RISK ASSESSMENT
[Patient reported PAP Smear was normal] : Patient reported PAP Smear was normal [Good] : ~his/her~  mood as  good [Yes] : Yes [1 or 2 (0 pts)] : 1 or 2 (0 points) [No] : In the past 12 months have you used drugs other than those required for medical reasons? No [2 - 4 times a month (2 pts)] : 2-4 times a month (2 points) [Never (0 pts)] : Never (0 points) [0] : 1) Little interest or pleasure doing things: Not at all (0) [Audit-CScore] : 2 [] : No [de-identified] : balance diet [[id number]] : 0 [de-identified] : no routine/no time with new baby [PapSmearDate] : 01/20 [PapSmearComments] : with Dr. Coley- cycles returned 12/31/2019--will resume Loestrin

## 2020-01-23 LAB
BASOPHILS # BLD AUTO: 0.03 K/UL
BASOPHILS NFR BLD AUTO: 0.3 %
EOSINOPHIL # BLD AUTO: 0.08 K/UL
EOSINOPHIL NFR BLD AUTO: 0.7 %
ESTIMATED AVERAGE GLUCOSE: 105 MG/DL
HBA1C MFR BLD HPLC: 5.3 %
HCT VFR BLD CALC: 41.6 %
HGB BLD-MCNC: 13.2 G/DL
IMM GRANULOCYTES NFR BLD AUTO: 0.4 %
LYMPHOCYTES # BLD AUTO: 3.78 K/UL
LYMPHOCYTES NFR BLD AUTO: 35.3 %
MAN DIFF?: NORMAL
MCHC RBC-ENTMCNC: 28.1 PG
MCHC RBC-ENTMCNC: 31.7 GM/DL
MCV RBC AUTO: 88.7 FL
MONOCYTES # BLD AUTO: 0.75 K/UL
MONOCYTES NFR BLD AUTO: 7 %
NEUTROPHILS # BLD AUTO: 6.03 K/UL
NEUTROPHILS NFR BLD AUTO: 56.3 %
PLATELET # BLD AUTO: 232 K/UL
RBC # BLD: 4.69 M/UL
RBC # FLD: 13.9 %
SAVE SPECIMEN: NORMAL
WBC # FLD AUTO: 10.71 K/UL

## 2020-10-09 DIAGNOSIS — Z12.31 ENCOUNTER FOR SCREENING MAMMOGRAM FOR MALIGNANT NEOPLASM OF BREAST: ICD-10-CM

## 2021-01-20 ENCOUNTER — APPOINTMENT (OUTPATIENT)
Dept: OBGYN | Facility: CLINIC | Age: 41
End: 2021-01-20
Payer: COMMERCIAL

## 2021-01-20 VITALS
WEIGHT: 172 LBS | HEIGHT: 64 IN | BODY MASS INDEX: 29.37 KG/M2 | SYSTOLIC BLOOD PRESSURE: 110 MMHG | DIASTOLIC BLOOD PRESSURE: 68 MMHG

## 2021-01-20 DIAGNOSIS — Z30.41 ENCOUNTER FOR SURVEILLANCE OF CONTRACEPTIVE PILLS: ICD-10-CM

## 2021-01-20 PROCEDURE — 99396 PREV VISIT EST AGE 40-64: CPT

## 2021-01-20 PROCEDURE — 99072 ADDL SUPL MATRL&STAF TM PHE: CPT

## 2021-01-22 NOTE — HISTORY OF PRESENT ILLNESS
[Patient reported mammogram was normal] : Patient reported mammogram was normal [Patient reported PAP Smear was normal] : Patient reported PAP Smear was normal [Y] : Patient is sexually active [Oral Contraceptive] : uses oral contraception pills [Monogamous (Male Partner)] : is monogamous with a male partner [TextBox_4] : Ms Jason is a 41 yo  who presents for annual gynecological care\par \par Reviewed interim changes in health\par \par has been doing well with no issues\par  [Mammogramdate] : 11/2020 [TextBox_19] : need breast ultrasound [PapSmeardate] : 1/2020 [LMPDate] : 12/21/20 [MensesFreq] : monthly [MensesLength] : 5 [MensesAmount] : light [PGxTotal] : 1 [[facility]] : [facility] [PGHxPremature] : 0 [PGHxAbortions] : 0 [[facility] Cliniciving] : 1 [FreeTextEntry1] : c-sxn x 1

## 2021-01-22 NOTE — PLAN
[FreeTextEntry1] : Annual gyn care\par normal breast and pelvic\par needs a breast ultrasound per radiology secondary to dense breasts\par cervical cancer screening up to date\par \par still unsure about another child\par reviewed risks of conception after 40 \par would see MFM early for possible cvs \par \par Answered questions\par \par Cristina Coley MD, PhD\par

## 2021-01-22 NOTE — REVIEW OF SYSTEMS
[Fever] : no fever [Fatigue] : no fatigue [Dyspnea] : no dyspnea [Cough] : no cough [Chest Pain] : no chest pain [Palpitations] : no palpitations [Abdominal Pain] : no abdominal pain [Constipation] : no constipation [Diarrhea] : diarrhea [Urgency] : no urgency [Frequency] : no frequency [Incontinence] : no incontinence [Breast Pain] : no breast pain [Breast Lump] : no breast lump [Skin Rash] : no skin rash [Mole Changes] : no mole changes [Arthralgias] : no arthralgias [Myalgias] : no myalgias [Headache] : no headache [Dizziness] : no dizziness [Anxiety] : no anxiety [Depression] : no depression [Sleep Disturbances] : no sleep disturbances [Decreased Libido] : libido not decreased [Deepening Voice] : no deepening voice [Feeling Weak] : not feeling weak [Hot Flashes] : no hot flashes [Easy Bleeding] : no easy bleeding [Easy Bruising] : no easy bruising [Swollen Glands] : no swollen glands [Negative] : Heme/Lymph

## 2021-01-22 NOTE — PHYSICAL EXAM
[Appropriately responsive] : appropriately responsive [Alert] : alert [No Acute Distress] : no acute distress [No Lymphadenopathy] : no lymphadenopathy [Soft] : soft [Non-tender] : non-tender [Non-distended] : non-distended [No HSM] : No HSM [No Mass] : no mass [Oriented x3] : oriented x3 [FreeTextEntry3] : no masses/enlargement [FreeTextEntry6] : Examined sitting and recumbent - dense breasts bilaterally [FreeTextEntry7] : incision well healed [Examination Of The Breasts] : a normal appearance [No Discharge] : no discharge [No Masses] : no breast masses were palpable [No Lesions] : no lesions  [Labia Majora] : normal [Labia Minora] : normal [Pink Rugae] : pink rugae [No Bleeding] : There was no active vaginal bleeding [Normal] : normal [Normal Position] : in a normal position [Tenderness] : nontender [Enlarged ___ wks] : not enlarged [Mass ___ cm] : no uterine mass was palpated [Uterine Adnexae] : normal [FreeTextEntry5] : no masses/polyps/cmt [FreeTextEntry9] : deferred [FreeTextEntry8] : no pelvic masses

## 2021-03-14 ENCOUNTER — LABORATORY RESULT (OUTPATIENT)
Age: 41
End: 2021-03-14

## 2021-03-15 ENCOUNTER — APPOINTMENT (OUTPATIENT)
Dept: INTERNAL MEDICINE | Facility: CLINIC | Age: 41
End: 2021-03-15
Payer: COMMERCIAL

## 2021-03-15 PROCEDURE — 36415 COLL VENOUS BLD VENIPUNCTURE: CPT

## 2021-03-15 PROCEDURE — 99072 ADDL SUPL MATRL&STAF TM PHE: CPT

## 2021-03-16 LAB
25(OH)D3 SERPL-MCNC: 16.7 NG/ML
ALBUMIN SERPL ELPH-MCNC: 4.4 G/DL
ALP BLD-CCNC: 62 U/L
ALT SERPL-CCNC: 18 U/L
ANION GAP SERPL CALC-SCNC: 19 MMOL/L
APPEARANCE: CLEAR
AST SERPL-CCNC: 37 U/L
BASOPHILS # BLD AUTO: 0.02 K/UL
BASOPHILS NFR BLD AUTO: 0.3 %
BILIRUB SERPL-MCNC: 0.2 MG/DL
BILIRUBIN URINE: NEGATIVE
BLOOD URINE: NEGATIVE
BUN SERPL-MCNC: 14 MG/DL
CALCIUM SERPL-MCNC: 9.7 MG/DL
CHLORIDE SERPL-SCNC: 104 MMOL/L
CHOLEST SERPL-MCNC: 215 MG/DL
CO2 SERPL-SCNC: 15 MMOL/L
COLOR: NORMAL
CREAT SERPL-MCNC: 0.72 MG/DL
EOSINOPHIL # BLD AUTO: 0.07 K/UL
EOSINOPHIL NFR BLD AUTO: 1 %
ERYTHROCYTE [SEDIMENTATION RATE] IN BLOOD BY WESTERGREN METHOD: < 2 MM/HR
ESTIMATED AVERAGE GLUCOSE: 108 MG/DL
GLUCOSE QUALITATIVE U: NEGATIVE
GLUCOSE SERPL-MCNC: 76 MG/DL
HBA1C MFR BLD HPLC: 5.4 %
HCT VFR BLD CALC: 41.6 %
HDLC SERPL-MCNC: 48 MG/DL
HGB BLD-MCNC: 13.2 G/DL
IMM GRANULOCYTES NFR BLD AUTO: 0.3 %
KETONES URINE: NEGATIVE
LDLC SERPL CALC-MCNC: 130 MG/DL
LDLC SERPL DIRECT ASSAY-MCNC: 128 MG/DL
LEUKOCYTE ESTERASE URINE: NEGATIVE
LYMPHOCYTES # BLD AUTO: 3.15 K/UL
LYMPHOCYTES NFR BLD AUTO: 45.8 %
MAN DIFF?: NORMAL
MCHC RBC-ENTMCNC: 28.3 PG
MCHC RBC-ENTMCNC: 31.7 GM/DL
MCV RBC AUTO: 89.3 FL
MONOCYTES # BLD AUTO: 0.45 K/UL
MONOCYTES NFR BLD AUTO: 6.5 %
NEUTROPHILS # BLD AUTO: 3.17 K/UL
NEUTROPHILS NFR BLD AUTO: 46.1 %
NITRITE URINE: NEGATIVE
NONHDLC SERPL-MCNC: 167 MG/DL
PH URINE: 7
PLATELET # BLD AUTO: 245 K/UL
POTASSIUM SERPL-SCNC: 4.4 MMOL/L
PROT SERPL-MCNC: 7.5 G/DL
PROTEIN URINE: NEGATIVE
RBC # BLD: 4.66 M/UL
RBC # FLD: 12.7 %
SODIUM SERPL-SCNC: 138 MMOL/L
SPECIFIC GRAVITY URINE: 1.02
T3 SERPL-MCNC: 74 NG/DL
T3RU NFR SERPL: 1 TBI
T4 FREE SERPL-MCNC: 1 NG/DL
TRIGL SERPL-MCNC: 182 MG/DL
TSH SERPL-ACNC: 3.2 UIU/ML
UROBILINOGEN URINE: NORMAL
WBC # FLD AUTO: 6.88 K/UL

## 2021-03-22 ENCOUNTER — APPOINTMENT (OUTPATIENT)
Dept: INTERNAL MEDICINE | Facility: CLINIC | Age: 41
End: 2021-03-22
Payer: COMMERCIAL

## 2021-03-22 ENCOUNTER — NON-APPOINTMENT (OUTPATIENT)
Age: 41
End: 2021-03-22

## 2021-03-22 VITALS
DIASTOLIC BLOOD PRESSURE: 70 MMHG | HEIGHT: 64 IN | BODY MASS INDEX: 29.53 KG/M2 | TEMPERATURE: 98 F | SYSTOLIC BLOOD PRESSURE: 100 MMHG | WEIGHT: 173 LBS

## 2021-03-22 PROCEDURE — 99072 ADDL SUPL MATRL&STAF TM PHE: CPT

## 2021-03-22 PROCEDURE — 93000 ELECTROCARDIOGRAM COMPLETE: CPT

## 2021-03-22 PROCEDURE — 99396 PREV VISIT EST AGE 40-64: CPT | Mod: 25

## 2021-03-28 NOTE — REVIEW OF SYSTEMS
[Recent Change In Weight] : ~T recent weight change [FreeTextEntry2] : 5-10 lbs weight gain since last year [Negative] : Heme/Lymph

## 2021-03-28 NOTE — PHYSICAL EXAM
[No Acute Distress] : no acute distress [Well Nourished] : well nourished [Well Developed] : well developed [Well-Appearing] : well-appearing [Normal Sclera/Conjunctiva] : normal sclera/conjunctiva [PERRL] : pupils equal round and reactive to light [EOMI] : extraocular movements intact [Normal Outer Ear/Nose] : the outer ears and nose were normal in appearance [Normal Oropharynx] : the oropharynx was normal [No JVD] : no jugular venous distention [No Lymphadenopathy] : no lymphadenopathy [Supple] : supple [Thyroid Normal, No Nodules] : the thyroid was normal and there were no nodules present [No Respiratory Distress] : no respiratory distress  [No Accessory Muscle Use] : no accessory muscle use [Clear to Auscultation] : lungs were clear to auscultation bilaterally [Normal Rate] : normal rate  [Regular Rhythm] : with a regular rhythm [Normal S1, S2] : normal S1 and S2 [No Murmur] : no murmur heard [No Carotid Bruits] : no carotid bruits [No Abdominal Bruit] : a ~M bruit was not heard ~T in the abdomen [No Varicosities] : no varicosities [Pedal Pulses Present] : the pedal pulses are present [No Edema] : there was no peripheral edema [No Palpable Aorta] : no palpable aorta [No Extremity Clubbing/Cyanosis] : no extremity clubbing/cyanosis [Normal Appearance] : normal in appearance [No Axillary Lymphadenopathy] : no axillary lymphadenopathy [Soft] : abdomen soft [Non Tender] : non-tender [Non-distended] : non-distended [No Masses] : no abdominal mass palpated [No HSM] : no HSM [Normal Bowel Sounds] : normal bowel sounds [Normal Posterior Cervical Nodes] : no posterior cervical lymphadenopathy [Normal Anterior Cervical Nodes] : no anterior cervical lymphadenopathy [No CVA Tenderness] : no CVA  tenderness [No Spinal Tenderness] : no spinal tenderness [No Joint Swelling] : no joint swelling [Grossly Normal Strength/Tone] : grossly normal strength/tone [No Rash] : no rash [Coordination Grossly Intact] : coordination grossly intact [No Focal Deficits] : no focal deficits [Normal Gait] : normal gait [Deep Tendon Reflexes (DTR)] : deep tendon reflexes were 2+ and symmetric [Normal Affect] : the affect was normal [Normal Insight/Judgement] : insight and judgment were intact [de-identified] : dense fibrocystic breasts

## 2021-03-28 NOTE — PHYSICAL EXAM
[No Acute Distress] : no acute distress [Well Nourished] : well nourished [Well Developed] : well developed [Well-Appearing] : well-appearing [Normal Sclera/Conjunctiva] : normal sclera/conjunctiva [PERRL] : pupils equal round and reactive to light [EOMI] : extraocular movements intact [Normal Outer Ear/Nose] : the outer ears and nose were normal in appearance [Normal Oropharynx] : the oropharynx was normal [No JVD] : no jugular venous distention [No Lymphadenopathy] : no lymphadenopathy [Supple] : supple [Thyroid Normal, No Nodules] : the thyroid was normal and there were no nodules present [No Respiratory Distress] : no respiratory distress  [No Accessory Muscle Use] : no accessory muscle use [Clear to Auscultation] : lungs were clear to auscultation bilaterally [Normal Rate] : normal rate  [Regular Rhythm] : with a regular rhythm [Normal S1, S2] : normal S1 and S2 [No Murmur] : no murmur heard [No Carotid Bruits] : no carotid bruits [No Abdominal Bruit] : a ~M bruit was not heard ~T in the abdomen [No Varicosities] : no varicosities [Pedal Pulses Present] : the pedal pulses are present [No Edema] : there was no peripheral edema [No Palpable Aorta] : no palpable aorta [No Extremity Clubbing/Cyanosis] : no extremity clubbing/cyanosis [Normal Appearance] : normal in appearance [No Axillary Lymphadenopathy] : no axillary lymphadenopathy [Soft] : abdomen soft [Non Tender] : non-tender [Non-distended] : non-distended [No Masses] : no abdominal mass palpated [No HSM] : no HSM [Normal Bowel Sounds] : normal bowel sounds [Normal Posterior Cervical Nodes] : no posterior cervical lymphadenopathy [Normal Anterior Cervical Nodes] : no anterior cervical lymphadenopathy [No CVA Tenderness] : no CVA  tenderness [No Spinal Tenderness] : no spinal tenderness [No Joint Swelling] : no joint swelling [Grossly Normal Strength/Tone] : grossly normal strength/tone [No Rash] : no rash [Coordination Grossly Intact] : coordination grossly intact [No Focal Deficits] : no focal deficits [Normal Gait] : normal gait [Deep Tendon Reflexes (DTR)] : deep tendon reflexes were 2+ and symmetric [Normal Affect] : the affect was normal [Normal Insight/Judgement] : insight and judgment were intact [de-identified] : dense fibrocystic breasts

## 2021-03-28 NOTE — HEALTH RISK ASSESSMENT
[HIV test declined] : HIV test declined [Hepatitis C test declined] : Hepatitis C test declined [Good] : ~his/her~  mood as  good [Yes] : Yes [2 - 4 times a month (2 pts)] : 2-4 times a month (2 points) [Never (0 pts)] : Never (0 points) [No] : In the past 12 months have you used drugs other than those required for medical reasons? No [0] : 2) Feeling down, depressed, or hopeless: Not at all (0) [Patient reported mammogram was normal] : Patient reported mammogram was normal [Patient reported PAP Smear was normal] : Patient reported PAP Smear was normal [] : No [Audit-CScore] : 2 [de-identified] : walks 2-3 times a week [de-identified] : overall healthy balanced and varied diet without any exclusions  [[id number]] : 0 [MammogramDate] : 11/20 [MammogramComments] : at [facility] [PapSmearDate] : 01/21 [PapSmearComments] : with Dr. Cristina Coley-cycles are regular off OCP

## 2021-03-28 NOTE — HISTORY OF PRESENT ILLNESS
[FreeTextEntry1] : Pt. presents for a comprehensive evaluation for multiple medical issues.\par  [de-identified] : Patient has been in good overall health this past year.\par She got pregnant last year shortly after her last visit and delivered a healthy baby boy via  in 2019 at [place], [place].  \par She has been working from home since the start of the COVID 19 pandemic \par \par She reports her weight is down since last year -she gained 8-9 lbs with her entire pregnancy then lost weight post-partum.  Her weight is down ~20-25 lbs since last year and stable.  She and her partner are trying to get pregnant this year--she went off OCPs last month\par She lives with her boyfriend/partner  of 6 years.

## 2021-03-28 NOTE — HISTORY OF PRESENT ILLNESS
[FreeTextEntry1] : Pt. presents for a comprehensive evaluation for multiple medical issues.\par  [de-identified] : Patient has been in good overall health this past year.\par She got pregnant last year shortly after her last visit and delivered a healthy baby boy via  in 2019 at [place], [place].  \par She has been working from home since the start of the COVID 19 pandemic \par \par She reports her weight is down since last year -she gained 8-9 lbs with her entire pregnancy then lost weight post-partum.  Her weight is down ~20-25 lbs since last year and stable.  She and her partner are trying to get pregnant this year--she went off OCPs last month\par She lives with her boyfriend/partner  of 6 years.

## 2021-03-28 NOTE — HEALTH RISK ASSESSMENT
[HIV test declined] : HIV test declined [Hepatitis C test declined] : Hepatitis C test declined [Good] : ~his/her~  mood as  good [Yes] : Yes [2 - 4 times a month (2 pts)] : 2-4 times a month (2 points) [Never (0 pts)] : Never (0 points) [No] : In the past 12 months have you used drugs other than those required for medical reasons? No [0] : 2) Feeling down, depressed, or hopeless: Not at all (0) [Patient reported mammogram was normal] : Patient reported mammogram was normal [Patient reported PAP Smear was normal] : Patient reported PAP Smear was normal [] : No [Audit-CScore] : 2 [de-identified] : walks 2-3 times a week [de-identified] : overall healthy balanced and varied diet without any exclusions  [[id number]] : 0 [MammogramDate] : 11/20 [MammogramComments] : at [facility] [PapSmearDate] : 01/21 [PapSmearComments] : with Dr. Cristina Coley-cycles are regular off OCP

## 2022-02-27 ENCOUNTER — NON-APPOINTMENT (OUTPATIENT)
Age: 42
End: 2022-02-27

## 2022-02-28 ENCOUNTER — APPOINTMENT (OUTPATIENT)
Dept: OBGYN | Facility: CLINIC | Age: 42
End: 2022-02-28
Payer: COMMERCIAL

## 2022-02-28 VITALS
HEIGHT: 64 IN | WEIGHT: 176 LBS | BODY MASS INDEX: 30.05 KG/M2 | SYSTOLIC BLOOD PRESSURE: 118 MMHG | DIASTOLIC BLOOD PRESSURE: 62 MMHG

## 2022-02-28 DIAGNOSIS — Z30.09 ENCOUNTER FOR OTHER GENERAL COUNSELING AND ADVICE ON CONTRACEPTION: ICD-10-CM

## 2022-02-28 DIAGNOSIS — Z12.4 ENCOUNTER FOR SCREENING FOR MALIGNANT NEOPLASM OF CERVIX: ICD-10-CM

## 2022-02-28 DIAGNOSIS — R92.2 INCONCLUSIVE MAMMOGRAM: ICD-10-CM

## 2022-02-28 DIAGNOSIS — Z12.39 ENCOUNTER FOR OTHER SCREENING FOR MALIGNANT NEOPLASM OF BREAST: ICD-10-CM

## 2022-02-28 DIAGNOSIS — Z11.51 ENCOUNTER FOR SCREENING FOR HUMAN PAPILLOMAVIRUS (HPV): ICD-10-CM

## 2022-02-28 PROCEDURE — 99396 PREV VISIT EST AGE 40-64: CPT

## 2022-03-01 NOTE — PHYSICAL EXAM
[Chaperone Present] : A chaperone was present in the examining room during all aspects of the physical examination [FreeTextEntry1] : JAKE Turner [Appropriately responsive] : appropriately responsive [Alert] : alert [No Acute Distress] : no acute distress [No Lymphadenopathy] : no lymphadenopathy [Soft] : soft [Non-tender] : non-tender [Non-distended] : non-distended [No HSM] : No HSM [No Mass] : no mass [Oriented x3] : oriented x3 [FreeTextEntry3] : no masses/enlargement [FreeTextEntry6] : Examined sitting and recumbent - dense breasts bilaterally [FreeTextEntry7] : incision well healed [Examination Of The Breasts] : a normal appearance [No Discharge] : no discharge [No Masses] : no breast masses were palpable [No Lesions] : no lesions  [Labia Majora] : normal [Labia Minora] : normal [Pink Rugae] : pink rugae [No Bleeding] : There was no active vaginal bleeding [Normal] : normal [Normal Position] : in a normal position [Tenderness] : nontender [Enlarged ___ wks] : not enlarged [Mass ___ cm] : no uterine mass was palpated [Uterine Adnexae] : normal [FreeTextEntry5] : no masses/polyps/cmt [FreeTextEntry9] : deferred [FreeTextEntry8] : no pelvic masses

## 2022-03-01 NOTE — HISTORY OF PRESENT ILLNESS
[Patient reported mammogram was normal] : Patient reported mammogram was normal [Patient reported PAP Smear was normal] : Patient reported PAP Smear was normal [N] : Patient does not use contraception [Y] : Patient is sexually active [Monogamous (Male Partner)] : is monogamous with a male partner [TextBox_4] : Ms Jason is a 41 yo  who presents for annual gynecological care\par \par Reviewed interim changes in health\par \par has been doing well with no issues\par \par Son diagnosed with autism - in therapy\par \par Interested in an IUD [Mammogramdate] : 12/2021 [BreastSonogramDate] : 12/2021 [PapSmeardate] : 1/2020 [LMPDate] : 2/11/22 [MensesFreq] : monthly [MensesLength] : 5 [MensesAmount] : light [PGxTotal] : 1 [BannerxFulerm] : 1 [PGHxPremature] : 0 [PGHxAbortions] : 0 [[facility]iving] : 1 [FreeTextEntry1] : c-sxn x 1

## 2022-03-01 NOTE — PLAN
[FreeTextEntry1] : Annual gynecological care\par Reviewed cervical cancer screening guidelines/recommendations from ACOG/ASCCP - desires every other year\par Breast cancer screening - up to date\par Colon cancer screening = will start at 46 yo\par \par BCM discussed IUD and risks of IUD including uterine perforation and increased risk of ectopic pregnancy\par given heavier bleeding would recommend Mirena \par likely will order\par \par \par Answered all questions\par Cristina Coley MD, PhD\par \par

## 2022-03-02 LAB — HPV HIGH+LOW RISK DNA PNL CVX: NOT DETECTED

## 2022-03-08 ENCOUNTER — TRANSCRIPTION ENCOUNTER (OUTPATIENT)
Age: 42
End: 2022-03-08

## 2022-03-08 LAB — CYTOLOGY CVX/VAG DOC THIN PREP: NORMAL

## 2022-03-16 ENCOUNTER — APPOINTMENT (OUTPATIENT)
Dept: INTERNAL MEDICINE | Facility: CLINIC | Age: 42
End: 2022-03-16
Payer: COMMERCIAL

## 2022-03-16 PROCEDURE — 36415 COLL VENOUS BLD VENIPUNCTURE: CPT

## 2022-03-17 LAB
25(OH)D3 SERPL-MCNC: 23.5 NG/ML
ALBUMIN SERPL ELPH-MCNC: 4.4 G/DL
ALP BLD-CCNC: 58 U/L
ALT SERPL-CCNC: 13 U/L
ANION GAP SERPL CALC-SCNC: 13 MMOL/L
APPEARANCE: CLEAR
AST SERPL-CCNC: 20 U/L
BASOPHILS # BLD AUTO: 0.02 K/UL
BASOPHILS NFR BLD AUTO: 0.4 %
BILIRUB SERPL-MCNC: 0.4 MG/DL
BILIRUBIN URINE: NEGATIVE
BLOOD URINE: NEGATIVE
BUN SERPL-MCNC: 12 MG/DL
CALCIUM SERPL-MCNC: 9.5 MG/DL
CHLORIDE SERPL-SCNC: 105 MMOL/L
CHOLEST SERPL-MCNC: 220 MG/DL
CO2 SERPL-SCNC: 22 MMOL/L
COLOR: YELLOW
CREAT SERPL-MCNC: 0.8 MG/DL
EGFR: 93 ML/MIN/1.73M2
EOSINOPHIL # BLD AUTO: 0.06 K/UL
EOSINOPHIL NFR BLD AUTO: 1.2 %
ERYTHROCYTE [SEDIMENTATION RATE] IN BLOOD BY WESTERGREN METHOD: 9 MM/HR
ESTIMATED AVERAGE GLUCOSE: 105 MG/DL
GLUCOSE QUALITATIVE U: NEGATIVE
GLUCOSE SERPL-MCNC: 88 MG/DL
HBA1C MFR BLD HPLC: 5.3 %
HCT VFR BLD CALC: 39 %
HDLC SERPL-MCNC: 49 MG/DL
HGB BLD-MCNC: 12.6 G/DL
IMM GRANULOCYTES NFR BLD AUTO: 0.4 %
KETONES URINE: NEGATIVE
LDLC SERPL CALC-MCNC: 135 MG/DL
LDLC SERPL DIRECT ASSAY-MCNC: 133 MG/DL
LEUKOCYTE ESTERASE URINE: NEGATIVE
LYMPHOCYTES # BLD AUTO: 2.06 K/UL
LYMPHOCYTES NFR BLD AUTO: 40.7 %
MAN DIFF?: NORMAL
MCHC RBC-ENTMCNC: 28.8 PG
MCHC RBC-ENTMCNC: 32.3 GM/DL
MCV RBC AUTO: 89 FL
MONOCYTES # BLD AUTO: 0.39 K/UL
MONOCYTES NFR BLD AUTO: 7.7 %
NEUTROPHILS # BLD AUTO: 2.51 K/UL
NEUTROPHILS NFR BLD AUTO: 49.6 %
NITRITE URINE: NEGATIVE
NONHDLC SERPL-MCNC: 170 MG/DL
PH URINE: 8
PLATELET # BLD AUTO: 220 K/UL
POTASSIUM SERPL-SCNC: 4.4 MMOL/L
PROT SERPL-MCNC: 7.1 G/DL
PROTEIN URINE: NORMAL
RBC # BLD: 4.38 M/UL
RBC # FLD: 13.2 %
SODIUM SERPL-SCNC: 139 MMOL/L
SPECIFIC GRAVITY URINE: 1.02
T3FREE SERPL-MCNC: 2.28 PG/ML
T4 FREE SERPL-MCNC: 1.1 NG/DL
TRIGL SERPL-MCNC: 177 MG/DL
TSH SERPL-ACNC: 1.67 UIU/ML
UROBILINOGEN URINE: NORMAL
WBC # FLD AUTO: 5.06 K/UL

## 2022-03-23 ENCOUNTER — APPOINTMENT (OUTPATIENT)
Dept: INTERNAL MEDICINE | Facility: CLINIC | Age: 42
End: 2022-03-23
Payer: COMMERCIAL

## 2022-03-23 ENCOUNTER — NON-APPOINTMENT (OUTPATIENT)
Age: 42
End: 2022-03-23

## 2022-03-23 VITALS
HEIGHT: 64 IN | TEMPERATURE: 97.4 F | SYSTOLIC BLOOD PRESSURE: 100 MMHG | DIASTOLIC BLOOD PRESSURE: 60 MMHG | WEIGHT: 173 LBS | BODY MASS INDEX: 29.53 KG/M2

## 2022-03-23 PROCEDURE — 99396 PREV VISIT EST AGE 40-64: CPT | Mod: 25

## 2022-03-23 PROCEDURE — 93000 ELECTROCARDIOGRAM COMPLETE: CPT

## 2022-03-23 RX ORDER — NORETHINDRONE ACETATE AND ETHINYL ESTRADIOL AND FERROUS FUMARATE 1.5-30(21)
1.5-3 KIT ORAL
Qty: 3 | Refills: 3 | Status: COMPLETED | COMMUNITY
Start: 2020-01-13 | End: 2022-03-23

## 2022-03-23 NOTE — HISTORY OF PRESENT ILLNESS
[FreeTextEntry1] : Pt. presents for a comprehensive evaluation for multiple medical issues.\par  [de-identified] : Patient has been in good overall health this past year.\par Her son is now 2 1/2 years old -was diagnosed with autism in 9/2021 shortly after he turned 1 yrs old. He was not speaking and lost certain words in his speech etc.  He is now getting full services-speech therapy, OT/PT and DEMETRICE.\par \par She is back commuting into work since last summer.\par \par Her family traveled to [place] last summer for 2 months to visit her partner's family.  They may go back to [place] this year.\par \par She reports her weight is down since last year -she gained 8-9 lbs with her entire pregnancy then lost weight post-partum.  Her weight is down ~20-25 lbs since last year and stable.  She and her partner are trying to get pregnant this year--she went off OCPs last month\par She lives with her boyfriend/partner  of 6 years.

## 2022-03-23 NOTE — PHYSICAL EXAM
[No Acute Distress] : no acute distress [Well Nourished] : well nourished [Well Developed] : well developed [Well-Appearing] : well-appearing [Normal Sclera/Conjunctiva] : normal sclera/conjunctiva [PERRL] : pupils equal round and reactive to light [EOMI] : extraocular movements intact [Normal Outer Ear/Nose] : the outer ears and nose were normal in appearance [No JVD] : no jugular venous distention [No Lymphadenopathy] : no lymphadenopathy [Supple] : supple [Thyroid Normal, No Nodules] : the thyroid was normal and there were no nodules present [No Respiratory Distress] : no respiratory distress  [No Accessory Muscle Use] : no accessory muscle use [Clear to Auscultation] : lungs were clear to auscultation bilaterally [Normal Rate] : normal rate  [Regular Rhythm] : with a regular rhythm [Normal S1, S2] : normal S1 and S2 [No Murmur] : no murmur heard [No Carotid Bruits] : no carotid bruits [No Abdominal Bruit] : a ~M bruit was not heard ~T in the abdomen [No Varicosities] : no varicosities [Pedal Pulses Present] : the pedal pulses are present [No Edema] : there was no peripheral edema [No Palpable Aorta] : no palpable aorta [No Extremity Clubbing/Cyanosis] : no extremity clubbing/cyanosis [Normal Appearance] : normal in appearance [No Axillary Lymphadenopathy] : no axillary lymphadenopathy [Soft] : abdomen soft [Non Tender] : non-tender [Non-distended] : non-distended [No Masses] : no abdominal mass palpated [No HSM] : no HSM [Normal Bowel Sounds] : normal bowel sounds [Normal Posterior Cervical Nodes] : no posterior cervical lymphadenopathy [Normal Anterior Cervical Nodes] : no anterior cervical lymphadenopathy [No CVA Tenderness] : no CVA  tenderness [No Spinal Tenderness] : no spinal tenderness [No Joint Swelling] : no joint swelling [Grossly Normal Strength/Tone] : grossly normal strength/tone [No Rash] : no rash [Coordination Grossly Intact] : coordination grossly intact [No Focal Deficits] : no focal deficits [Normal Gait] : normal gait [Deep Tendon Reflexes (DTR)] : deep tendon reflexes were 2+ and symmetric [Normal Affect] : the affect was normal [Normal Insight/Judgement] : insight and judgment were intact [de-identified] : dense fibrocystic breasts

## 2022-03-23 NOTE — HEALTH RISK ASSESSMENT
[Good] : ~his/her~  mood as  good [Yes] : Yes [Never (0 pts)] : Never (0 points) [No] : In the past 12 months have you used drugs other than those required for medical reasons? No [0] : 2) Feeling down, depressed, or hopeless: Not at all (0) [Patient reported mammogram was normal] : Patient reported mammogram was normal [Patient reported PAP Smear was normal] : Patient reported PAP Smear was normal [HIV test declined] : HIV test declined [Hepatitis C test declined] : Hepatitis C test declined [Never] : Never [2 - 3 times a week (3 pts)] : 2 - 3  times a week (3 points) [de-identified] : ophthalmologist\par  within the past year [de-identified] : wine ~ 2times a [place] [Audit-CScore] : 2 [de-identified] : no routine exercise over the winter-walks and goes to the park with son weather permitting [de-identified] : overall healthy balanced and varied diet without any exclusions  [[id number]] : 0 [MammogramDate] : 12/21 [MammogramComments] : at [facility] [PapSmearDate] : 02/22 [PapSmearComments] : with Dr. Cristina Coley-cycles are regular off OCP-has appt for Mirena IUD next month

## 2022-03-23 NOTE — ASSESSMENT
[FreeTextEntry1] : Pt received the J & J Covid vaccines on 03/05/2021\par and J& J Booster shot on 11/20/2021

## 2022-04-13 ENCOUNTER — APPOINTMENT (OUTPATIENT)
Dept: OBGYN | Facility: CLINIC | Age: 42
End: 2022-04-13
Payer: COMMERCIAL

## 2022-04-13 VITALS
SYSTOLIC BLOOD PRESSURE: 100 MMHG | DIASTOLIC BLOOD PRESSURE: 60 MMHG | BODY MASS INDEX: 29.88 KG/M2 | HEIGHT: 64 IN | WEIGHT: 175 LBS

## 2022-04-13 DIAGNOSIS — Z30.430 ENCOUNTER FOR INSERTION OF INTRAUTERINE CONTRACEPTIVE DEVICE: ICD-10-CM

## 2022-04-13 LAB
HCG UR QL: NEGATIVE
QUALITY CONTROL: YES

## 2022-04-13 PROCEDURE — 81003 URINALYSIS AUTO W/O SCOPE: CPT | Mod: QW

## 2022-04-13 PROCEDURE — 81025 URINE PREGNANCY TEST: CPT

## 2022-04-13 PROCEDURE — 58300 INSERT INTRAUTERINE DEVICE: CPT

## 2022-04-13 NOTE — REASON FOR VISIT
Pertinent PMH/PSH/FHx/SHx and Review of Systems contained within:  32 yo m with no PMH presents in ED c/o intermittent chest pain for the last month. 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 In ED, patient reports that he is asymptomatic. No aggravating or relieving factors, No fever/chills, No photophobia/eye pain/changes in vision, No ear pain/sore throat/dysphagia, No palpitations, no SOB/cough/wheeze/stridor, No abdominal pain, No N/V/D, no dysuria/frequency/discharge, No neck/back pain, no rash, no changes in neurological status/function. [Follow-Up] : a follow-up evaluation of [FreeTextEntry2] : IUD insertion

## 2022-04-13 NOTE — REVIEW OF SYSTEMS
[Negative] : Heme/Lymph [Fever] : no fever [Fatigue] : no fatigue [Dyspnea] : no dyspnea [Cough] : no cough [Chest Pain] : no chest pain [Palpitations] : no palpitations [Abdominal Pain] : no abdominal pain [Constipation] : no constipation [Diarrhea] : diarrhea [Urgency] : no urgency [Frequency] : no frequency [Incontinence] : no incontinence [Breast Pain] : no breast pain [Breast Lump] : no breast lump [Skin Rash] : no skin rash [Mole Changes] : no mole changes [Arthralgias] : no arthralgias [Myalgias] : no myalgias [Headache] : no headache [Dizziness] : no dizziness [Anxiety] : no anxiety [Depression] : no depression [Sleep Disturbances] : no sleep disturbances [Decreased Libido] : libido not decreased [Deepening Voice] : no deepening voice [Feeling Weak] : not feeling weak [Hot Flashes] : no hot flashes [Easy Bleeding] : no easy bleeding [Easy Bruising] : no easy bruising [Swollen Glands] : no swollen glands

## 2022-04-13 NOTE — PROCEDURE
[IUD Placement] : intrauterine device (IUD) placement [Time out performed] : Pre-procedure time out performed.  Patient's name, date of birth and procedure confirmed. [Consent Obtained] : Consent obtained [Prevention of Pregnancy] : prevention of pregnancy [Risks] : risks [Benefits] : benefits [Alternatives] : alternatives [Patient] : patient [Infection] : infection [Bleeding] : bleeding [Pain] : pain [Expulsion] : expulsion [Failure] : failure [Uterine Perforation] : uterine perforation [Neg Pregnancy Test] : negative pregnancy test [No Premedication] : No premedication [Betadine] : Betadine [Tenaculum] : Tenaculum [Easy Passage] : Easy passage [Sounded to ___ cm] : sounded to [unfilled] ~Ucm [Post Placement Transvag. US] : post placement transvaginal ultrasound [Mirena IUD] : Mirena IUD [Tolerated Well] : Patient tolerated the procedure well [No Complications] : No complications [Motrin/Ibuprofen] : Motrin/Ibuprofen [LMPDate] : 4/3/22 [de-identified] : see consent for lot number

## 2022-04-13 NOTE — PHYSICAL EXAM
[Chaperone Present] : A chaperone was present in the examining room during all aspects of the physical examination [Appropriately responsive] : appropriately responsive [Alert] : alert [No Acute Distress] : no acute distress [Soft] : soft [Non-tender] : non-tender [Non-distended] : non-distended [No HSM] : No HSM [No Mass] : no mass [Oriented x3] : oriented x3 [No Lesions] : no lesions  [Labia Majora] : normal [Labia Minora] : normal [Pink Rugae] : pink rugae [No Bleeding] : There was no active vaginal bleeding [Normal] : normal [Normal Position] : in a normal position [FreeTextEntry1] : JAKE Turner [Tenderness] : nontender [Enlarged ___ wks] : not enlarged [Mass ___ cm] : no uterine mass was palpated [FreeTextEntry5] : no masses/polyps/cmt [FreeTextEntry9] : deferred

## 2022-04-13 NOTE — PLAN
[FreeTextEntry1] : IUD placed under ultrasound guidance\par Appropriate location\par no complications\par patient tolerated well\par \par Answered questions\par \par Cristina Coley MD, PhD\par

## 2022-04-13 NOTE — HISTORY OF PRESENT ILLNESS
[Patient reported mammogram was normal] : Patient reported mammogram was normal [Patient reported PAP Smear was normal] : Patient reported PAP Smear was normal [N] : Patient does not use contraception [Y] : Patient is sexually active [Monogamous (Male Partner)] : is monogamous with a male partner [Mammogramdate] : 12/2021 [BreastSonogramDate] : 12/2021 [PapSmeardate] : 1/2022 [LMPDate] : 4/3/22 [MensesFreq] : monthly [MensesLength] : 5 [MensesAmount] : light [PGxTotal] : 1 [[facility] CenterxFulerm] : 1 [PGHxPremature] : 0 [PGHxAbortions] : 0 [[facility] Specialty Hospitaliving] : 1 [FreeTextEntry1] : c-sxn x 1

## 2023-03-08 ENCOUNTER — APPOINTMENT (OUTPATIENT)
Dept: OBGYN | Facility: CLINIC | Age: 43
End: 2023-03-08

## 2023-04-12 ENCOUNTER — NON-APPOINTMENT (OUTPATIENT)
Age: 43
End: 2023-04-12

## 2023-04-12 ENCOUNTER — APPOINTMENT (OUTPATIENT)
Dept: INTERNAL MEDICINE | Facility: CLINIC | Age: 43
End: 2023-04-12
Payer: COMMERCIAL

## 2023-04-12 VITALS
BODY MASS INDEX: 30.73 KG/M2 | HEIGHT: 64 IN | DIASTOLIC BLOOD PRESSURE: 70 MMHG | SYSTOLIC BLOOD PRESSURE: 100 MMHG | TEMPERATURE: 97.6 F | WEIGHT: 180 LBS

## 2023-04-12 PROCEDURE — 99396 PREV VISIT EST AGE 40-64: CPT | Mod: 25

## 2023-04-12 PROCEDURE — 93000 ELECTROCARDIOGRAM COMPLETE: CPT

## 2023-04-12 RX ORDER — PRENATAL VIT,CAL 76/IRON/FOLIC 29 MG-1 MG
29-1 TABLET ORAL
Qty: 30 | Refills: 0 | Status: COMPLETED | COMMUNITY
Start: 2019-09-23 | End: 2023-04-12

## 2023-04-12 RX ORDER — FLUTICASONE PROPIONATE 50 UG/1
50 SPRAY, METERED NASAL DAILY
Qty: 1 | Refills: 3 | Status: ACTIVE | COMMUNITY
Start: 2023-04-12 | End: 1900-01-01

## 2023-05-01 NOTE — REVIEW OF SYSTEMS
[Recent Change In Weight] : ~T recent weight change [Negative] : Heme/Lymph [FreeTextEntry2] : weight is up since last year

## 2023-05-01 NOTE — PHYSICAL EXAM
[No Acute Distress] : no acute distress [Well Nourished] : well nourished [Well Developed] : well developed [Well-Appearing] : well-appearing [Normal Sclera/Conjunctiva] : normal sclera/conjunctiva [PERRL] : pupils equal round and reactive to light [EOMI] : extraocular movements intact [Normal Outer Ear/Nose] : the outer ears and nose were normal in appearance [No JVD] : no jugular venous distention [No Lymphadenopathy] : no lymphadenopathy [Supple] : supple [Thyroid Normal, No Nodules] : the thyroid was normal and there were no nodules present [No Respiratory Distress] : no respiratory distress  [No Accessory Muscle Use] : no accessory muscle use [Clear to Auscultation] : lungs were clear to auscultation bilaterally [Normal Rate] : normal rate  [Regular Rhythm] : with a regular rhythm [Normal S1, S2] : normal S1 and S2 [No Murmur] : no murmur heard [No Carotid Bruits] : no carotid bruits [No Abdominal Bruit] : a ~M bruit was not heard ~T in the abdomen [No Varicosities] : no varicosities [Pedal Pulses Present] : the pedal pulses are present [No Edema] : there was no peripheral edema [No Palpable Aorta] : no palpable aorta [No Extremity Clubbing/Cyanosis] : no extremity clubbing/cyanosis [Normal Appearance] : normal in appearance [No Axillary Lymphadenopathy] : no axillary lymphadenopathy [Soft] : abdomen soft [Non Tender] : non-tender [Non-distended] : non-distended [No Masses] : no abdominal mass palpated [No HSM] : no HSM [Normal Bowel Sounds] : normal bowel sounds [No CVA Tenderness] : no CVA  tenderness [No Spinal Tenderness] : no spinal tenderness [No Joint Swelling] : no joint swelling [Grossly Normal Strength/Tone] : grossly normal strength/tone [No Rash] : no rash [Coordination Grossly Intact] : coordination grossly intact [No Focal Deficits] : no focal deficits [Normal Gait] : normal gait [Deep Tendon Reflexes (DTR)] : deep tendon reflexes were 2+ and symmetric [Normal Affect] : the affect was normal [Normal Insight/Judgement] : insight and judgment were intact [de-identified] : dense fibrocystic breasts

## 2023-05-01 NOTE — HISTORY OF PRESENT ILLNESS
[FreeTextEntry1] : Pt. presents for a comprehensive evaluation for multiple medical issues.\par  [de-identified] : Patient has been in good overall health this past year.\par Her son is now 3 1/2 years old -was diagnosed with autism in 9/2021 shortly after he turned 1 yrs old. He was not speaking and lost certain words in his speech etc.  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  She works 4 days in person and 1 day from home.\par \par Her family traveled to [place] last summer for 2 months to visit her partner's family.  They may go back to [place] this year.\par \par

## 2023-10-09 NOTE — HEALTH RISK ASSESSMENT
[Good] : ~his/her~  mood as  good [Yes] : Yes [2 - 3 times a week (3 pts)] : 2 - 3  times a week (3 points) [Never (0 pts)] : Never (0 points) [No] : In the past 12 months have you used drugs other than those required for medical reasons? No [0] : 2) Feeling down, depressed, or hopeless: Not at all (0) [Patient reported mammogram was normal] : Patient reported mammogram was normal [Patient reported PAP Smear was normal] : Patient reported PAP Smear was normal [HIV test declined] : HIV test declined [Hepatitis C test declined] : Hepatitis C test declined [Never] : Never [de-identified] : ophthalmologist\par  within the past year [de-identified] : wine ~ 2times a week [Audit-CScore] : 2 [de-identified] : no routine exercise over the winter-walks and goes to the park with son weather permitting [de-identified] : overall healthy balanced and varied diet without any exclusions  [[id number]] : 0 [MammogramDate] : 01/23 [MammogramComments] : at [facility] [PapSmearDate] : 04/22 [PapSmearComments] : with Dr. Cristina Coley-had Mirena IUD placed in 2022 [ColonoscopyComments] : needs to schedule Consent Type: Consent 1 (Standard)

## 2023-10-30 ENCOUNTER — NON-APPOINTMENT (OUTPATIENT)
Age: 43
End: 2023-10-30

## 2023-10-30 ENCOUNTER — APPOINTMENT (OUTPATIENT)
Dept: OBGYN | Facility: CLINIC | Age: 43
End: 2023-10-30
Payer: COMMERCIAL

## 2023-10-30 VITALS
HEIGHT: 64 IN | SYSTOLIC BLOOD PRESSURE: 102 MMHG | DIASTOLIC BLOOD PRESSURE: 72 MMHG | BODY MASS INDEX: 31.07 KG/M2 | WEIGHT: 182 LBS

## 2023-10-30 DIAGNOSIS — T83.39XA OTHER MECHANICAL COMPLICATION OF INTRAUTERINE CONTRACEPTIVE DEVICE, INITIAL ENCOUNTER: ICD-10-CM

## 2023-10-30 DIAGNOSIS — Z01.419 ENCOUNTER FOR GYNECOLOGICAL EXAMINATION (GENERAL) (ROUTINE) W/OUT ABNORMAL FINDINGS: ICD-10-CM

## 2023-10-30 DIAGNOSIS — Z12.11 ENCOUNTER FOR SCREENING FOR MALIGNANT NEOPLASM OF COLON: ICD-10-CM

## 2023-10-30 PROCEDURE — 99396 PREV VISIT EST AGE 40-64: CPT

## 2023-11-21 ENCOUNTER — APPOINTMENT (OUTPATIENT)
Dept: INTERNAL MEDICINE | Facility: CLINIC | Age: 43
End: 2023-11-21
Payer: COMMERCIAL

## 2023-11-21 VITALS
OXYGEN SATURATION: 100 % | HEIGHT: 64 IN | HEART RATE: 55 BPM | WEIGHT: 181 LBS | DIASTOLIC BLOOD PRESSURE: 84 MMHG | BODY MASS INDEX: 30.9 KG/M2 | SYSTOLIC BLOOD PRESSURE: 136 MMHG

## 2023-11-21 DIAGNOSIS — M54.2 CERVICALGIA: ICD-10-CM

## 2023-11-21 PROCEDURE — 99213 OFFICE O/P EST LOW 20 MIN: CPT

## 2024-01-11 DIAGNOSIS — Z00.00 ENCOUNTER FOR GENERAL ADULT MEDICAL EXAMINATION W/OUT ABNORMAL FINDINGS: ICD-10-CM

## 2024-01-11 DIAGNOSIS — Z29.9 ENCOUNTER FOR PROPHYLACTIC MEASURES, UNSPECIFIED: ICD-10-CM

## 2024-01-16 ENCOUNTER — TRANSCRIPTION ENCOUNTER (OUTPATIENT)
Age: 44
End: 2024-01-16

## 2024-02-09 ENCOUNTER — NON-APPOINTMENT (OUTPATIENT)
Age: 44
End: 2024-02-09

## 2024-03-18 ENCOUNTER — NON-APPOINTMENT (OUTPATIENT)
Age: 44
End: 2024-03-18

## 2024-03-29 ENCOUNTER — APPOINTMENT (OUTPATIENT)
Dept: OBGYN | Facility: CLINIC | Age: 44
End: 2024-03-29
Payer: COMMERCIAL

## 2024-03-29 VITALS
WEIGHT: 172 LBS | SYSTOLIC BLOOD PRESSURE: 110 MMHG | DIASTOLIC BLOOD PRESSURE: 70 MMHG | BODY MASS INDEX: 29.37 KG/M2 | HEIGHT: 64 IN

## 2024-03-29 DIAGNOSIS — Z97.5 PRESENCE OF (INTRAUTERINE) CONTRACEPTIVE DEVICE: ICD-10-CM

## 2024-03-29 DIAGNOSIS — N83.209 UNSPECIFIED OVARIAN CYST, UNSPECIFIED SIDE: ICD-10-CM

## 2024-03-29 PROCEDURE — 99213 OFFICE O/P EST LOW 20 MIN: CPT

## 2024-03-29 PROCEDURE — 76857 US EXAM PELVIC LIMITED: CPT

## 2024-03-29 NOTE — PROCEDURE
[Locate IUD] : locate IUD [Suspected Ovarian Cyst] : suspected ovarian cyst [Transvaginal Ultrasound] : transvaginal ultrasound [L: ___ cm] : L: [unfilled] cm [Anteverted] : anteverted [W: ___cm] : W: [unfilled] cm [H: ___ cm] : H: [unfilled] cm [FreeTextEntry5] : IUD noted at uterine fundus. Endometrial stripe 5.45 cm [FreeTextEntry7] : 2.17 x 3.20 with hemorrhagic cyst multiple smaller follicles [FreeTextEntry8] : 2.6 x 3.1 Simple follicular cyst noted [FreeTextEntry4] : IUD in correct location with benign appearing cystic lesions bilaterally typical for PCOS and recent diagnosis of hemorrhagic cyst

## 2024-03-29 NOTE — PHYSICAL EXAM
[Chaperone Present] : A chaperone was present in the examining room during all aspects of the physical examination [Appropriately responsive] : appropriately responsive [FreeTextEntry1] : SOLA Hercules [Soft] : soft [Non-tender] : non-tender [Non-distended] : non-distended [No Lesions] : no lesions [No Mass] : no mass [Labia Minora] : normal [Labia Majora] : normal [Normal] : normal [Tenderness] : nontender [Normal Position] : in a normal position [Uterine Adnexae] : non-palpable [FreeTextEntry5] : No IUD strings visualized

## 2024-03-29 NOTE — HISTORY OF PRESENT ILLNESS
[FreeTextEntry1] : 44-year-old P1 here for ED follow-up from evaluation of hemorrhagic cyst noted after an acute abdominal pain episode March 17, 2023. Patient had sudden episode of abdominal pain received pelvic ultrasound and CT scan noting hemorrhagic unilateral. She received no further intervention and was advised to follow-up with GYN. Patient has known condition of PCOS with Mirena IUD in place for over 2 years. She denies any continued abdominal pain at this time [Patient reported mammogram was normal] : Patient reported mammogram was normal [Patient reported breast sonogram was normal] : Patient reported breast sonogram was normal [Patient reported PAP Smear was normal] : Patient reported PAP Smear was normal [Y] : Patient is sexually active [BreastSonogramDate] : 1/2023 [TextBox_19] : BIRADS I [Mammogramdate] : 1/2023 [TextBox_25] : 1/2023 [PapSmeardate] : 2/2022 [TextBox_31] : NILM/HPV neg [MensesFreq] : 20-30 [MensesLength] : 4 [PGxTotal] : 1 [[facility] HospitalxFulerm] : 1 [[facility] Valleyiving] : 1 [Currently Active] : currently active [Men] : men

## 2024-04-30 ENCOUNTER — LABORATORY RESULT (OUTPATIENT)
Age: 44
End: 2024-04-30

## 2024-05-20 LAB
25(OH)D3 SERPL-MCNC: 17.6 NG/ML
ALBUMIN SERPL ELPH-MCNC: 4.2 G/DL
ALP BLD-CCNC: 58 U/L
ALT SERPL-CCNC: 12 U/L
ANION GAP SERPL CALC-SCNC: 8 MMOL/L
APPEARANCE: CLEAR
AST SERPL-CCNC: 16 U/L
BASOPHILS # BLD AUTO: 0.02 K/UL
BASOPHILS NFR BLD AUTO: 0.3 %
BILIRUB SERPL-MCNC: 0.4 MG/DL
BILIRUBIN URINE: NEGATIVE
BLOOD URINE: NEGATIVE
BUN SERPL-MCNC: 15 MG/DL
CALCIUM SERPL-MCNC: 9.4 MG/DL
CHLORIDE SERPL-SCNC: 105 MMOL/L
CHOLEST SERPL-MCNC: 232 MG/DL
CO2 SERPL-SCNC: 25 MMOL/L
COLOR: YELLOW
CREAT SERPL-MCNC: 0.8 MG/DL
EGFR: 92 ML/MIN/1.73M2
EOSINOPHIL # BLD AUTO: 0.06 K/UL
EOSINOPHIL NFR BLD AUTO: 1 %
ERYTHROCYTE [SEDIMENTATION RATE] IN BLOOD BY WESTERGREN METHOD: 7 MM/HR
ESTIMATED AVERAGE GLUCOSE: 111 MG/DL
GLUCOSE QUALITATIVE U: NEGATIVE MG/DL
GLUCOSE SERPL-MCNC: 84 MG/DL
HBA1C MFR BLD HPLC: 5.5 %
HCT VFR BLD CALC: 34.3 %
HDLC SERPL-MCNC: 46 MG/DL
HGB BLD-MCNC: 11.1 G/DL
IMM GRANULOCYTES NFR BLD AUTO: 0.2 %
KETONES URINE: NEGATIVE MG/DL
LDLC SERPL CALC-MCNC: 152 MG/DL
LEUKOCYTE ESTERASE URINE: NEGATIVE
LYMPHOCYTES # BLD AUTO: 2.25 K/UL
LYMPHOCYTES NFR BLD AUTO: 35.8 %
MAN DIFF?: NORMAL
MCHC RBC-ENTMCNC: 28.2 PG
MCHC RBC-ENTMCNC: 32.4 GM/DL
MCV RBC AUTO: 87.3 FL
MONOCYTES # BLD AUTO: 0.51 K/UL
MONOCYTES NFR BLD AUTO: 8.1 %
NEUTROPHILS # BLD AUTO: 3.44 K/UL
NEUTROPHILS NFR BLD AUTO: 54.6 %
NITRITE URINE: NEGATIVE
NONHDLC SERPL-MCNC: 186 MG/DL
PH URINE: 5.5
PLATELET # BLD AUTO: 216 K/UL
POTASSIUM SERPL-SCNC: 4.3 MMOL/L
PROT SERPL-MCNC: 6.7 G/DL
PROTEIN URINE: NEGATIVE MG/DL
RBC # BLD: 3.93 M/UL
RBC # FLD: 13.2 %
SODIUM SERPL-SCNC: 138 MMOL/L
SPECIFIC GRAVITY URINE: 1.03
T3 SERPL-MCNC: 85 NG/DL
T3RU NFR SERPL: 0.9 TBI
T4 FREE SERPL-MCNC: 1 NG/DL
TRIGL SERPL-MCNC: 190 MG/DL
TSH SERPL-ACNC: 1.98 UIU/ML
UROBILINOGEN URINE: 0.2 MG/DL
WBC # FLD AUTO: 6.29 K/UL

## 2024-05-22 ENCOUNTER — APPOINTMENT (OUTPATIENT)
Dept: INTERNAL MEDICINE | Facility: CLINIC | Age: 44
End: 2024-05-22
Payer: COMMERCIAL

## 2024-05-22 ENCOUNTER — NON-APPOINTMENT (OUTPATIENT)
Age: 44
End: 2024-05-22

## 2024-05-22 VITALS
SYSTOLIC BLOOD PRESSURE: 104 MMHG | BODY MASS INDEX: 32.27 KG/M2 | HEIGHT: 64 IN | HEART RATE: 55 BPM | DIASTOLIC BLOOD PRESSURE: 67 MMHG | OXYGEN SATURATION: 100 % | WEIGHT: 189 LBS

## 2024-05-22 DIAGNOSIS — Z00.00 ENCOUNTER FOR GENERAL ADULT MEDICAL EXAMINATION W/OUT ABNORMAL FINDINGS: ICD-10-CM

## 2024-05-22 DIAGNOSIS — R63.5 ABNORMAL WEIGHT GAIN: ICD-10-CM

## 2024-05-22 PROCEDURE — 93000 ELECTROCARDIOGRAM COMPLETE: CPT

## 2024-05-22 PROCEDURE — 99396 PREV VISIT EST AGE 40-64: CPT

## 2024-05-23 NOTE — HEALTH RISK ASSESSMENT
[Good] : ~his/her~  mood as  good [Yes] : Yes [2 - 3 times a week (3 pts)] : 2 - 3  times a week (3 points) [Never (0 pts)] : Never (0 points) [No] : In the past 12 months have you used drugs other than those required for medical reasons? No [0] : 2) Feeling down, depressed, or hopeless: Not at all (0) [Never] : Never [Patient reported mammogram was normal] : Patient reported mammogram was normal [Patient reported PAP Smear was normal] : Patient reported PAP Smear was normal [HIV test declined] : HIV test declined [Hepatitis C test declined] : Hepatitis C test declined [Intercurrent ED visits] : went to ED [de-identified] : for ovarian cyst rupture [de-identified] : dermatologist  within the past year [de-identified] : wine ~ 2times a week [Audit-CScore] : 2 [de-identified] : no routine exercise over the winter-walks and goes to the park with son weather permitting [de-identified] : overall healthy balanced and varied diet without any exclusions  [FAP4Lxjdj] : 0 [MammogramDate] : 02/24 [MammogramComments] : at [facility] [PapSmearDate] : 03/24 [PapSmearComments] : had Mirena IUD placed in 2022--was diagnosed with an ovarian cyst rupture [ColonoscopyComments] : needs to schedule

## 2024-05-23 NOTE — REVIEW OF SYSTEMS
[Negative] : Heme/Lymph [Recent Change In Weight] : ~T no recent weight change [FreeTextEntry2] : weight is up since last year

## 2024-05-23 NOTE — PHYSICAL EXAM
[No Acute Distress] : no acute distress [Well Nourished] : well nourished [Well Developed] : well developed [Well-Appearing] : well-appearing [Normal Sclera/Conjunctiva] : normal sclera/conjunctiva [PERRL] : pupils equal round and reactive to light [EOMI] : extraocular movements intact [Normal Outer Ear/Nose] : the outer ears and nose were normal in appearance [No JVD] : no jugular venous distention [No Lymphadenopathy] : no lymphadenopathy [Supple] : supple [Thyroid Normal, No Nodules] : the thyroid was normal and there were no nodules present [No Respiratory Distress] : no respiratory distress  [No Accessory Muscle Use] : no accessory muscle use [Clear to Auscultation] : lungs were clear to auscultation bilaterally [Normal Rate] : normal rate  [Regular Rhythm] : with a regular rhythm [Normal S1, S2] : normal S1 and S2 [No Murmur] : no murmur heard [No Carotid Bruits] : no carotid bruits [No Abdominal Bruit] : a ~M bruit was not heard ~T in the abdomen [Pedal Pulses Present] : the pedal pulses are present [No Varicosities] : no varicosities [No Edema] : there was no peripheral edema [No Palpable Aorta] : no palpable aorta [No Extremity Clubbing/Cyanosis] : no extremity clubbing/cyanosis [Normal Appearance] : normal in appearance [No Axillary Lymphadenopathy] : no axillary lymphadenopathy [Soft] : abdomen soft [Non Tender] : non-tender [Non-distended] : non-distended [No Masses] : no abdominal mass palpated [No HSM] : no HSM [Normal Bowel Sounds] : normal bowel sounds [No CVA Tenderness] : no CVA  tenderness [No Spinal Tenderness] : no spinal tenderness [No Joint Swelling] : no joint swelling [Grossly Normal Strength/Tone] : grossly normal strength/tone [No Rash] : no rash [Coordination Grossly Intact] : coordination grossly intact [No Focal Deficits] : no focal deficits [Normal Gait] : normal gait [Deep Tendon Reflexes (DTR)] : deep tendon reflexes were 2+ and symmetric [Normal Affect] : the affect was normal [Normal Insight/Judgement] : insight and judgment were intact [de-identified] : dense fibrocystic breasts

## 2024-05-23 NOTE — HISTORY OF PRESENT ILLNESS
[FreeTextEntry1] : Pt. presents for a comprehensive evaluation for multiple medical issues.\par   [de-identified] : Patient has been in good overall health this past year. Her son is now 4 1/2 years old -was diagnosed with autism in 9/2021 shortly after he turned 1 yrs old. He now attends a special school in the city.  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  She works 4 days in person and 1 day from home.  Her family traveled to [place] last year with her partner.  She plans to travel with her son and mother to visit [facility] this summer.

## 2024-07-03 ENCOUNTER — ASOB RESULT (OUTPATIENT)
Age: 44
End: 2024-07-03

## 2024-07-03 ENCOUNTER — APPOINTMENT (OUTPATIENT)
Dept: OBGYN | Facility: CLINIC | Age: 44
End: 2024-07-03
Payer: COMMERCIAL

## 2024-07-03 PROCEDURE — 76830 TRANSVAGINAL US NON-OB: CPT

## 2024-07-12 ENCOUNTER — APPOINTMENT (OUTPATIENT)
Dept: OBGYN | Facility: CLINIC | Age: 44
End: 2024-07-12
Payer: COMMERCIAL

## 2024-07-12 DIAGNOSIS — N83.209 UNSPECIFIED OVARIAN CYST, UNSPECIFIED SIDE: ICD-10-CM

## 2024-07-12 DIAGNOSIS — Z97.5 PRESENCE OF (INTRAUTERINE) CONTRACEPTIVE DEVICE: ICD-10-CM

## 2024-07-12 PROCEDURE — 99441: CPT

## 2024-11-11 ENCOUNTER — APPOINTMENT (OUTPATIENT)
Dept: INTERNAL MEDICINE | Facility: CLINIC | Age: 44
End: 2024-11-11

## 2024-11-11 PROCEDURE — 99213 OFFICE O/P EST LOW 20 MIN: CPT | Mod: 95

## 2024-12-11 ENCOUNTER — APPOINTMENT (OUTPATIENT)
Dept: OBGYN | Facility: CLINIC | Age: 44
End: 2024-12-11
Payer: COMMERCIAL

## 2024-12-11 VITALS
WEIGHT: 194 LBS | SYSTOLIC BLOOD PRESSURE: 108 MMHG | HEIGHT: 64 IN | BODY MASS INDEX: 33.12 KG/M2 | DIASTOLIC BLOOD PRESSURE: 70 MMHG

## 2024-12-11 DIAGNOSIS — Z12.31 ENCOUNTER FOR SCREENING MAMMOGRAM FOR MALIGNANT NEOPLASM OF BREAST: ICD-10-CM

## 2024-12-11 DIAGNOSIS — R92.30 DENSE BREASTS, UNSPECIFIED: ICD-10-CM

## 2024-12-11 DIAGNOSIS — Z01.419 ENCOUNTER FOR GYNECOLOGICAL EXAMINATION (GENERAL) (ROUTINE) W/OUT ABNORMAL FINDINGS: ICD-10-CM

## 2024-12-11 PROCEDURE — 99459 PELVIC EXAMINATION: CPT

## 2024-12-11 PROCEDURE — 99396 PREV VISIT EST AGE 40-64: CPT

## 2024-12-12 LAB — HPV HIGH+LOW RISK DNA PNL CVX: NOT DETECTED

## 2024-12-13 ENCOUNTER — APPOINTMENT (OUTPATIENT)
Dept: OBGYN | Facility: CLINIC | Age: 44
End: 2024-12-13
Payer: COMMERCIAL

## 2024-12-18 ENCOUNTER — TRANSCRIPTION ENCOUNTER (OUTPATIENT)
Age: 44
End: 2024-12-18

## 2024-12-18 LAB — CYTOLOGY CVX/VAG DOC THIN PREP: NORMAL

## 2025-04-16 ENCOUNTER — NON-APPOINTMENT (OUTPATIENT)
Age: 45
End: 2025-04-16

## 2025-07-08 ENCOUNTER — LABORATORY RESULT (OUTPATIENT)
Age: 45
End: 2025-07-08

## 2025-07-09 ENCOUNTER — TRANSCRIPTION ENCOUNTER (OUTPATIENT)
Age: 45
End: 2025-07-09

## 2025-07-09 LAB
25(OH)D3 SERPL-MCNC: 13 NG/ML
ALBUMIN SERPL ELPH-MCNC: 4.2 G/DL
ALP BLD-CCNC: 67 U/L
ALT SERPL-CCNC: 15 U/L
ANION GAP SERPL CALC-SCNC: 12 MMOL/L
APPEARANCE: CLEAR
AST SERPL-CCNC: 20 U/L
BASOPHILS # BLD AUTO: 0.03 K/UL
BASOPHILS NFR BLD AUTO: 0.4 %
BILIRUB SERPL-MCNC: 0.3 MG/DL
BILIRUBIN URINE: NEGATIVE
BLOOD URINE: NEGATIVE
BUN SERPL-MCNC: 9 MG/DL
CALCIUM SERPL-MCNC: 9.4 MG/DL
CHLORIDE SERPL-SCNC: 105 MMOL/L
CHOLEST SERPL-MCNC: 210 MG/DL
CO2 SERPL-SCNC: 22 MMOL/L
COLOR: YELLOW
CREAT SERPL-MCNC: 0.79 MG/DL
EGFRCR SERPLBLD CKD-EPI 2021: 93 ML/MIN/1.73M2
EOSINOPHIL # BLD AUTO: 0.08 K/UL
EOSINOPHIL NFR BLD AUTO: 1 %
ERYTHROCYTE [SEDIMENTATION RATE] IN BLOOD BY WESTERGREN METHOD: 5 MM/HR
ESTIMATED AVERAGE GLUCOSE: 108 MG/DL
GLUCOSE QUALITATIVE U: NEGATIVE MG/DL
GLUCOSE SERPL-MCNC: 87 MG/DL
HBA1C MFR BLD HPLC: 5.4 %
HCT VFR BLD CALC: 38.3 %
HDLC SERPL-MCNC: 39 MG/DL
HGB BLD-MCNC: 12.3 G/DL
IMM GRANULOCYTES NFR BLD AUTO: 0.4 %
KETONES URINE: NEGATIVE MG/DL
LDLC SERPL-MCNC: 138 MG/DL
LEUKOCYTE ESTERASE URINE: ABNORMAL
LYMPHOCYTES # BLD AUTO: 2.65 K/UL
LYMPHOCYTES NFR BLD AUTO: 31.7 %
MAN DIFF?: NORMAL
MCHC RBC-ENTMCNC: 29.6 PG
MCHC RBC-ENTMCNC: 32.1 G/DL
MCV RBC AUTO: 92.1 FL
MONOCYTES # BLD AUTO: 0.69 K/UL
MONOCYTES NFR BLD AUTO: 8.3 %
NEUTROPHILS # BLD AUTO: 4.87 K/UL
NEUTROPHILS NFR BLD AUTO: 58.2 %
NITRITE URINE: NEGATIVE
NONHDLC SERPL-MCNC: 171 MG/DL
PH URINE: 6
PLATELET # BLD AUTO: 230 K/UL
POTASSIUM SERPL-SCNC: 4 MMOL/L
PROT SERPL-MCNC: 6.8 G/DL
PROTEIN URINE: NEGATIVE MG/DL
RBC # BLD: 4.16 M/UL
RBC # FLD: 14.4 %
SODIUM SERPL-SCNC: 139 MMOL/L
SPECIFIC GRAVITY URINE: 1.02
T3FREE SERPL-MCNC: 2.39 PG/ML
TRIGL SERPL-MCNC: 186 MG/DL
TSH SERPL-ACNC: 1.68 UIU/ML
UROBILINOGEN URINE: 0.2 MG/DL
WBC # FLD AUTO: 8.35 K/UL

## 2025-07-14 ENCOUNTER — APPOINTMENT (OUTPATIENT)
Dept: INTERNAL MEDICINE | Facility: CLINIC | Age: 45
End: 2025-07-14

## 2025-07-14 VITALS
SYSTOLIC BLOOD PRESSURE: 115 MMHG | HEART RATE: 68 BPM | HEIGHT: 64 IN | BODY MASS INDEX: 34.15 KG/M2 | DIASTOLIC BLOOD PRESSURE: 70 MMHG | WEIGHT: 200 LBS | OXYGEN SATURATION: 98 %

## 2025-07-14 VITALS — TEMPERATURE: 97.5 F

## 2025-07-14 PROCEDURE — 99396 PREV VISIT EST AGE 40-64: CPT

## 2025-07-14 PROCEDURE — 93000 ELECTROCARDIOGRAM COMPLETE: CPT
